# Patient Record
Sex: FEMALE | Race: WHITE | NOT HISPANIC OR LATINO
[De-identification: names, ages, dates, MRNs, and addresses within clinical notes are randomized per-mention and may not be internally consistent; named-entity substitution may affect disease eponyms.]

---

## 2017-11-20 ENCOUNTER — RECORD ABSTRACTING (OUTPATIENT)
Age: 67
End: 2017-11-20

## 2017-11-20 ENCOUNTER — APPOINTMENT (OUTPATIENT)
Dept: CARDIOLOGY | Facility: CLINIC | Age: 67
End: 2017-11-20
Payer: MEDICARE

## 2017-11-20 ENCOUNTER — NON-APPOINTMENT (OUTPATIENT)
Age: 67
End: 2017-11-20

## 2017-11-20 VITALS
BODY MASS INDEX: 23.39 KG/M2 | WEIGHT: 132 LBS | SYSTOLIC BLOOD PRESSURE: 138 MMHG | HEIGHT: 63 IN | DIASTOLIC BLOOD PRESSURE: 70 MMHG | HEART RATE: 86 BPM

## 2017-11-20 VITALS — DIASTOLIC BLOOD PRESSURE: 64 MMHG | SYSTOLIC BLOOD PRESSURE: 120 MMHG

## 2017-11-20 DIAGNOSIS — F41.9 ANXIETY DISORDER, UNSPECIFIED: ICD-10-CM

## 2017-11-20 DIAGNOSIS — Z87.2 PERSONAL HISTORY OF DISEASES OF THE SKIN AND SUBCUTANEOUS TISSUE: ICD-10-CM

## 2017-11-20 DIAGNOSIS — Z86.2 PERSONAL HISTORY OF DISEASES OF THE BLOOD AND BLOOD-FORMING ORGANS AND CERTAIN DISORDERS INVOLVING THE IMMUNE MECHANISM: ICD-10-CM

## 2017-11-20 DIAGNOSIS — Z82.49 FAMILY HISTORY OF ISCHEMIC HEART DISEASE AND OTHER DISEASES OF THE CIRCULATORY SYSTEM: ICD-10-CM

## 2017-11-20 DIAGNOSIS — E03.9 HYPOTHYROIDISM, UNSPECIFIED: ICD-10-CM

## 2017-11-20 DIAGNOSIS — Z80.51 FAMILY HISTORY OF MALIGNANT NEOPLASM OF KIDNEY: ICD-10-CM

## 2017-11-20 DIAGNOSIS — Z86.59 PERSONAL HISTORY OF OTHER MENTAL AND BEHAVIORAL DISORDERS: ICD-10-CM

## 2017-11-20 DIAGNOSIS — E66.3 OVERWEIGHT: ICD-10-CM

## 2017-11-20 DIAGNOSIS — Z78.9 OTHER SPECIFIED HEALTH STATUS: ICD-10-CM

## 2017-11-20 DIAGNOSIS — Z85.79 PERSONAL HISTORY OF OTHER MALIGNANT NEOPLASMS OF LYMPHOID, HEMATOPOIETIC AND RELATED TISSUES: ICD-10-CM

## 2017-11-20 DIAGNOSIS — Z86.39 PERSONAL HISTORY OF OTHER ENDOCRINE, NUTRITIONAL AND METABOLIC DISEASE: ICD-10-CM

## 2017-11-20 PROBLEM — Z00.00 ENCOUNTER FOR PREVENTIVE HEALTH EXAMINATION: Status: ACTIVE | Noted: 2017-11-20

## 2017-11-20 PROCEDURE — 93000 ELECTROCARDIOGRAM COMPLETE: CPT

## 2017-11-20 PROCEDURE — 99214 OFFICE O/P EST MOD 30 MIN: CPT

## 2017-11-20 RX ORDER — ATORVASTATIN CALCIUM 10 MG/1
10 TABLET, FILM COATED ORAL DAILY
Refills: 0 | Status: DISCONTINUED | COMMUNITY
End: 2017-11-20

## 2017-11-20 RX ORDER — FERROUS SULFATE 325(65) MG
325 (65 FE) TABLET ORAL DAILY
Refills: 0 | Status: DISCONTINUED | COMMUNITY
End: 2017-11-20

## 2017-11-28 ENCOUNTER — APPOINTMENT (OUTPATIENT)
Dept: CARDIOLOGY | Facility: CLINIC | Age: 67
End: 2017-11-28
Payer: MEDICARE

## 2017-11-28 PROCEDURE — 93306 TTE W/DOPPLER COMPLETE: CPT

## 2018-02-27 ENCOUNTER — APPOINTMENT (OUTPATIENT)
Dept: CARDIOLOGY | Facility: CLINIC | Age: 68
End: 2018-02-27
Payer: MEDICARE

## 2018-02-27 VITALS
HEIGHT: 63 IN | DIASTOLIC BLOOD PRESSURE: 68 MMHG | HEART RATE: 76 BPM | SYSTOLIC BLOOD PRESSURE: 110 MMHG | WEIGHT: 138 LBS | BODY MASS INDEX: 24.45 KG/M2

## 2018-02-27 PROCEDURE — 99214 OFFICE O/P EST MOD 30 MIN: CPT

## 2018-02-27 RX ORDER — CETIRIZINE HCL 10 MG
10 TABLET ORAL DAILY
Refills: 0 | Status: DISCONTINUED | COMMUNITY
End: 2018-02-27

## 2018-12-18 ENCOUNTER — APPOINTMENT (OUTPATIENT)
Dept: CARDIOLOGY | Facility: CLINIC | Age: 68
End: 2018-12-18
Payer: MEDICARE

## 2018-12-18 ENCOUNTER — NON-APPOINTMENT (OUTPATIENT)
Age: 68
End: 2018-12-18

## 2018-12-18 VITALS
BODY MASS INDEX: 22.68 KG/M2 | HEIGHT: 63 IN | OXYGEN SATURATION: 100 % | DIASTOLIC BLOOD PRESSURE: 60 MMHG | WEIGHT: 128 LBS | SYSTOLIC BLOOD PRESSURE: 123 MMHG | HEART RATE: 68 BPM

## 2018-12-18 DIAGNOSIS — R07.89 OTHER CHEST PAIN: ICD-10-CM

## 2018-12-18 PROCEDURE — 93306 TTE W/DOPPLER COMPLETE: CPT

## 2018-12-18 PROCEDURE — 99214 OFFICE O/P EST MOD 30 MIN: CPT

## 2018-12-18 PROCEDURE — 93000 ELECTROCARDIOGRAM COMPLETE: CPT

## 2018-12-18 RX ORDER — VITAMIN B COMPLEX
TABLET, EXTENDED RELEASE ORAL
Refills: 0 | Status: DISCONTINUED | COMMUNITY
End: 2018-12-18

## 2018-12-18 NOTE — PHYSICAL EXAM
[General Appearance - Well Developed] : well developed [Normal Appearance] : normal appearance [Well Groomed] : well groomed [General Appearance - Well Nourished] : well nourished [No Deformities] : no deformities [General Appearance - In No Acute Distress] : no acute distress [Normal Conjunctiva] : the conjunctiva exhibited no abnormalities [No Oral Pallor] : no oral pallor [Normal Jugular Venous A Waves Present] : normal jugular venous A waves present [Normal Jugular Venous V Waves Present] : normal jugular venous V waves present [No Jugular Venous Rosas A Waves] : no jugular venous rosas A waves [Respiration, Rhythm And Depth] : normal respiratory rhythm and effort [Exaggerated Use Of Accessory Muscles For Inspiration] : no accessory muscle use [Auscultation Breath Sounds / Voice Sounds] : lungs were clear to auscultation bilaterally [Heart Rate And Rhythm] : heart rate and rhythm were normal [Heart Sounds] : normal S1 and S2 [Arterial Pulses Normal] : the arterial pulses were normal [Edema] : no peripheral edema present [Veins - Varicosity Changes] : no varicosital changes were noted in the lower extremities [FreeTextEntry1] : hsm 2/6 at the base, No gallop, r, or bruit. It radiated systolic murmur [Abdomen Soft] : soft [Abnormal Walk] : normal gait [Gait - Sufficient For Exercise Testing] : the gait was sufficient for exercise testing [Nail Clubbing] : no clubbing of the fingernails [Cyanosis, Localized] : no localized cyanosis [Skin Color & Pigmentation] : normal skin color and pigmentation [] : no rash [No Xanthoma] : no  xanthoma was observed [Oriented To Time, Place, And Person] : oriented to person, place, and time [Affect] : the affect was normal [Mood] : the mood was normal [No Anxiety] : not feeling anxious

## 2018-12-18 NOTE — REVIEW OF SYSTEMS
[Feeling Fatigued] : feeling fatigued [Shortness Of Breath] : no shortness of breath [Dyspnea on exertion] : not dyspnea during exertion [Chest  Pressure] : no chest pressure [Chest Pain] : no chest pain [Lower Ext Edema] : no extremity edema [Leg Claudication] : no intermittent leg claudication [Palpitations] : palpitations [Anxiety] : anxiety [Negative] : Heme/Lymph

## 2018-12-18 NOTE — DISCUSSION/SUMMARY
[Patient] : the patient [Risks] : risks [Benefits] : benefits [Alternatives] : alternatives [___ Month(s)] : [unfilled] month(s) [FreeTextEntry1] : Assessment and recommendations.\par #1 echocardiogram and EKG reviewed. Stable. Mitral, aortic, and tricuspid insufficiency were noted. The moderate left atrial enlargement, with normal LV ejection fraction dimension. Borderline pulmonary artery systolic pressure. Clinically, no signs of left or right heart failure. Increasing activity and exercise. If worsening exertional symptoms. Consider noninvasive stress test like stress echocardiogram or nuclear myocardial perfusion scan. Otherwise, followup on echocardiogram is planned in 2-3 years.\par #2 history of essential hypertension. Her remaining very well controlled without any medications. Continue to follow.\par #3 hyperlipidemia. Mixed. Continue present medications. Labs to be forwarded to me for my review.\par #4 palpitations. No significant high risk arrhythmia noted in the past. She is preserved and ejection fraction. No significant ischemic heart disease. Continue to follow. If any significant worsening. She will contact me.\par \par All the above were at length reviewed. Answered all the questions. Thank you very much for this kind referral. Please do not hesitate to give me a call for any question.\par Part of this transcription was done with voice recognition software and phonetically similar errors are common. I apologize for that. Please donot hesitate to call for any questions due to above.\par \par Followup is otherwise planned in one year\par

## 2018-12-18 NOTE — HISTORY OF PRESENT ILLNESS
[FreeTextEntry1] : 67-year-old.\par Recently been treated for lymphoma.\par History of hypertension. On low dose of beta blockers in the past. Not on any medication at present. No history of congestive heart failure or renal insufficiency.\par History of mixed hyperlipidemia with elevated triglycerides. On fenofibrate and statin therapy. Tolerating it well.\par Hypothyroidism. . On Synthroid replacement be a stable TSH within last 3 months. We have been followed with endocrinologist.\par History of psoriasis.\par History of chronic anemia.\par History of palpitations in the past. Prior to evaluation, including echocardiogram and stress echocardiogram in 2016, which showed preserved systolic function and known ischemic stress echocardiogram. Evaluation with Holter and event recorder showing no significant high risk arrhythmias.\par Echocardiogram 2017 with preserved ejection fraction mild mitral, aortic, and tricuspid regurgitation, and borderline pulmonary artery systolic pressure.\par

## 2018-12-18 NOTE — ASSESSMENT
[FreeTextEntry1] : past tests for reference:\par Echocardiogram.  8/8/2016.  60%.  Trace to mild mitral regurgitation.  Normal chamber dimensions.\par Stress echocardiogram 8/16/2016.\par LV ejection fraction 60% at baseline.  6 minutes 24 seconds of Derrick protocol.  Greater than 100% predicted maximum heart rate.  Normal blood pressure response.  Nonischemic symptoms.  Nonischemic EKG changes.  Nonischemic echocardiogram portion of the test.\par EKG ordered and interpreted by me on November 21, 2017, PA indication. Palpitations.\par Interpretation. Normal sinus rhythm. Borderline abnormal EKG,\par Recent labs were reviewed on the phone done by oncologist, which showed triglycerides 217, AST of 48, , total cholesterol 276. CMP was stable.\par \par Reviewed February 27, 2018.\par Echocardiogram. November 28, 2017. Ejection fraction 60% normal chamber dimensions trace to mitral regurgitation, trace to mild aortic regurgitation PSP 37 mm mercury, suggestive borderline pulmonary pressures.\par Event recorder did not reveal any significant arrhythmias. She did not have significant palpitations.\par Labs from December 8, 2017 was reviewed\par \par Reviewed on December 18, 2018\par EKG December 18, 2018. Indication palpitation. History of essential hypertension with interpretation. Normal sinus rhythm.\par Echocardiogram was reviewed from December 18, 2018.\par Preserved ejection fraction. Mild mitral, aortic, and tricuspid regurgitation. Pulmonary artery systolic pressure around 33 mm mercury.\par I do not have recent labs though she does have recent labs, which he will be forwarding to me

## 2018-12-18 NOTE — REASON FOR VISIT
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 No significant aggravating or relieving factors. No associated dizziness, chest pain or shortness of breath. Mild severity.\par She does not do aggressive exercise and has stable dyspnea on exertion without any associated symptoms.\par She has no PND, orthopnea, or pedal edema.\par She has no history of syncope.\par She has no claudication pain.\par No recent cardiac hospital admission

## 2019-01-04 ENCOUNTER — APPOINTMENT (OUTPATIENT)
Dept: CARDIOLOGY | Facility: CLINIC | Age: 69
End: 2019-01-04
Payer: MEDICARE

## 2019-01-04 VITALS
WEIGHT: 130 LBS | HEART RATE: 70 BPM | SYSTOLIC BLOOD PRESSURE: 110 MMHG | DIASTOLIC BLOOD PRESSURE: 64 MMHG | BODY MASS INDEX: 23.04 KG/M2 | HEIGHT: 63 IN

## 2019-01-04 DIAGNOSIS — I65.01 OCCLUSION AND STENOSIS OF RIGHT VERTEBRAL ARTERY: ICD-10-CM

## 2019-01-04 DIAGNOSIS — R42 DIZZINESS AND GIDDINESS: ICD-10-CM

## 2019-01-04 PROCEDURE — 99214 OFFICE O/P EST MOD 30 MIN: CPT

## 2019-02-18 ENCOUNTER — RECORD ABSTRACTING (OUTPATIENT)
Age: 69
End: 2019-02-18

## 2019-02-21 ENCOUNTER — APPOINTMENT (OUTPATIENT)
Dept: CARDIOLOGY | Facility: CLINIC | Age: 69
End: 2019-02-21
Payer: MEDICARE

## 2019-02-21 VITALS
HEART RATE: 68 BPM | OXYGEN SATURATION: 98 % | WEIGHT: 120 LBS | DIASTOLIC BLOOD PRESSURE: 60 MMHG | BODY MASS INDEX: 21.26 KG/M2 | HEIGHT: 63 IN | SYSTOLIC BLOOD PRESSURE: 102 MMHG

## 2019-02-21 DIAGNOSIS — Z86.79 PERSONAL HISTORY OF OTHER DISEASES OF THE CIRCULATORY SYSTEM: ICD-10-CM

## 2019-02-21 PROCEDURE — 99214 OFFICE O/P EST MOD 30 MIN: CPT

## 2019-02-21 RX ORDER — LEVOTHYROXINE SODIUM 150 UG/1
150 TABLET ORAL DAILY
Refills: 0 | Status: DISCONTINUED | COMMUNITY
End: 2019-02-21

## 2019-02-21 RX ORDER — NALTREXONE HYDROCHLORIDE 50 MG/1
50 TABLET, FILM COATED ORAL DAILY
Refills: 0 | Status: DISCONTINUED | COMMUNITY
End: 2019-02-21

## 2019-02-21 RX ORDER — ATORVASTATIN CALCIUM 10 MG/1
10 TABLET, FILM COATED ORAL DAILY
Refills: 0 | Status: DISCONTINUED | COMMUNITY
End: 2019-02-21

## 2019-02-21 RX ORDER — CHOLECALCIFEROL (VITAMIN D3) 325 MCG
10000 CAPSULE ORAL
Refills: 0 | Status: DISCONTINUED | COMMUNITY
End: 2019-02-21

## 2019-02-21 NOTE — REVIEW OF SYSTEMS
[Feeling Fatigued] : feeling fatigued [see HPI] : see HPI [Shortness Of Breath] : no shortness of breath [Dyspnea on exertion] : not dyspnea during exertion [Chest  Pressure] : no chest pressure [Chest Pain] : no chest pain [Lower Ext Edema] : no extremity edema [Leg Claudication] : no intermittent leg claudication [Palpitations] : palpitations [Anxiety] : anxiety [Negative] : Heme/Lymph

## 2019-02-21 NOTE — HISTORY OF PRESENT ILLNESS
[FreeTextEntry1] : \par Recently been treated for lymphoma.\par History of hypertension. On low dose of beta blockers in the past. Not on any medication at present. No history of congestive heart failure or renal insufficiency.\par History of mixed hyperlipidemia with elevated triglycerides. On fenofibrate and statin therapy. Tolerating it well.\par Hypothyroidism. . On Synthroid replacement be a stable TSH within last 3 months. We have been followed with endocrinologist.\par History of psoriasis.\par History of chronic anemia.\par History of hyperlipidemia, on lifestyle modification\par History of palpitations in the past. Prior to evaluation, including echocardiogram and stress echocardiogram in 2016, which showed preserved systolic function and known ischemic stress echocardiogram. Evaluation with Holter and event recorder showing no significant high risk arrhythmias.\par Echocardiogram 2017 with preserved ejection fraction mild mitral, aortic, and tricuspid regurgitation, and borderline pulmonary artery systolic pressure.\par

## 2019-02-21 NOTE — DISCUSSION/SUMMARY
[Patient] : the patient [Risks] : risks [Benefits] : benefits [Alternatives] : alternatives [___ Month(s)] : [unfilled] month(s) [FreeTextEntry1] : Assessment and recommendations.\par #1 Event recorder findings reviewed. No significant relationship with symptoms. Brief episodes of atrial arrhythmia Does not make the diagnosis of atrial fibrillation. If intermittent palpitations, any decreased significant dizziness and near syncopal/syncopal episode consider implantable loop recorder. She does not want to pursue it at present. We have also discussed beta blockers, which are does not want to pursue.\par #2 history of essential hypertension. Her remaining very well controlled without any medications. Continue to follow.\par #3 hyperlipidemia. Mixed. Continue present medications. Labs to be forwarded to me for my review.  Does not want to  and will manage with pharmacotherapy.\par \par All the above were at length reviewed. Answered all the questions. Thank you very much for this kind referral. Please do not hesitate to give me a call for any question.\par Part of this transcription was done with voice recognition software and phonetically similar errors are common. I apologize for that. Please donot hesitate to call for any questions due to above.\par \par Followup is otherwise planned in one year\par

## 2019-02-21 NOTE — REASON FOR VISIT
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 No significant aggravating or relieving factors. No associated dizziness, chest pain or shortness of breath. Mild severity. She has decided against beta blocker\par She does not do aggressive exercise and has stable dyspnea on exertion without any associated symptoms.\par She has no PND, orthopnea, or pedal edema.\par She has no history of syncope.\par She has no claudication pain.\par No recent cardiac hospital admission

## 2019-02-21 NOTE — ASSESSMENT
[FreeTextEntry1] : past tests for reference:\par Echocardiogram.  8/8/2016.  60%.  Trace to mild mitral regurgitation.  Normal chamber dimensions.\par Stress echocardiogram 8/16/2016.\par LV ejection fraction 60% at baseline.  6 minutes 24 seconds of Derrick protocol.  Greater than 100% predicted maximum heart rate.  Normal blood pressure response.  Nonischemic symptoms.  Nonischemic EKG changes.  Nonischemic echocardiogram portion of the test.\par EKG ordered and interpreted by me on November 21, 2017, PA indication. Palpitations.\par Interpretation. Normal sinus rhythm. Borderline abnormal EKG,\par Recent labs were reviewed on the phone done by oncologist, which showed triglycerides 217, AST of 48, , total cholesterol 276. CMP was stable.\par \par February 21, 2019\par Event recorder. Normal sinus rhythm at baseline. Sinus tachycardia. Short less than 10 seconds of sequential PACs versus atrial tachycardia. No pauses noted.\par \par Reviewed February 27, 2018.\par Echocardiogram. November 28, 2017. Ejection fraction 60% normal chamber dimensions trace to mitral regurgitation, trace to mild aortic regurgitation PSP 37 mm mercury, suggestive borderline pulmonary pressures.\par Event recorder did not reveal any significant arrhythmias. She did not have significant palpitations.\par Labs from December 8, 2017 was reviewed\par \par Reviewed on December 18, 2018\par EKG December 18, 2018. Indication palpitation. History of essential hypertension with interpretation. Normal sinus rhythm.\par Echocardiogram was reviewed from December 18, 2018.\par Preserved ejection fraction. Mild mitral, aortic, and tricuspid regurgitation. Pulmonary artery systolic pressure around 33 mm mercury.\par I do not have recent labs though she does have recent labs, which he will be forwarding to me

## 2019-04-09 ENCOUNTER — APPOINTMENT (OUTPATIENT)
Dept: CARDIOLOGY | Facility: CLINIC | Age: 69
End: 2019-04-09
Payer: MEDICARE

## 2019-04-09 ENCOUNTER — NON-APPOINTMENT (OUTPATIENT)
Age: 69
End: 2019-04-09

## 2019-04-09 PROCEDURE — 99212 OFFICE O/P EST SF 10 MIN: CPT

## 2019-04-09 PROCEDURE — 93000 ELECTROCARDIOGRAM COMPLETE: CPT

## 2019-04-10 NOTE — PHYSICAL EXAM
[General Appearance - Well Developed] : well developed [Normal Appearance] : normal appearance [Well Groomed] : well groomed [General Appearance - Well Nourished] : well nourished [No Deformities] : no deformities [General Appearance - In No Acute Distress] : no acute distress [] : no respiratory distress [Respiration, Rhythm And Depth] : normal respiratory rhythm and effort [Auscultation Breath Sounds / Voice Sounds] : lungs were clear to auscultation bilaterally [Exaggerated Use Of Accessory Muscles For Inspiration] : no accessory muscle use [Heart Rate And Rhythm] : heart rate and rhythm were normal [Abnormal Walk] : normal gait [Heart Sounds] : normal S1 and S2 [Murmurs] : no murmurs present [Oriented To Time, Place, And Person] : oriented to person, place, and time [Skin Color & Pigmentation] : normal skin color and pigmentation [Affect] : the affect was normal [Mood] : the mood was normal

## 2019-04-11 NOTE — REVIEW OF SYSTEMS
[see HPI] : see HPI [Palpitations] : palpitations [Anxiety] : anxiety [Under Stress] : under stress [Negative] : Neurological [Dyspnea on exertion] : not dyspnea during exertion [Shortness Of Breath] : no shortness of breath [Chest Pain] : no chest pain [Chest  Pressure] : no chest pressure [Lower Ext Edema] : no extremity edema

## 2019-04-11 NOTE — HISTORY OF PRESENT ILLNESS
[FreeTextEntry1] : Ellie is a 68 y/o F with history of lymphoma, HLD, hypothyroidism, and anemia. \par No history of AF, MI, ischemic heart disease, CVA or TIA. \par \par She was last evaluated in our office Jan 2018 after a hospitalization for dizziness and syncopal episode. There was no evidence of acute neurologic event. No significant carotid stenosis. There was stenosis of right vertebral artery. Dx of mild concussion. In follow up, an event recorder was obtained. There was a very brief run of atach <3 seconds. This was reviewed with her by Dr Bustos and no further testing ordered or medication changes made. \par \par Since then, there has been no recurrent syncope. She comes in today because of increased awareness of her heart beat. Reports rate is controlled in the 80s but she continues to be concerned for the presence of afib. She denies chest pain, pressure, unusual shortness of breath, orthopnea, LE edema, lightheadedness, dizziness, near syncope.\par \par EKG today 4/9/19 ordered and interpreted by me reveals normal sinus rhythm. \par \par Most recent echocardiogram 12/2018 reveals normal LVEF and mild VHD.\par Stress echo in 2016 was nonischemic.

## 2019-04-11 NOTE — ASSESSMENT
[FreeTextEntry1] : Assessment and recommendations:\par \par 1) Awareness of heart beat: Patient denies any sensation of rapid rate at home. She checks her pulse and states usually 80s. Recent event recorder did not reveal any significant dysrhythmias. There was a very brief episode of atach < 3 seconds. Today her EKG reveals normal sinus rhythm. If there is continued concern for afib, I reviewed with her indications for event monitor. Otherwise she will continue to monitor her rate and rhythm at home and call us for any escalating symptoms. \par \par 2) Syncope: no recurrence; no evidence of ischemic heart disease or LV dysfunction by past testing in our office\par \par 3) Vertebrobasilar disease: tolerating antiplatelet therapy well. Continue followup with neurology. \par \par 4) HLD: tolerating statin therapy, continue current dose. \par \par Any questions and concerns were addressed and resolved. \par \par Sincerely,\par \par GAYATHRI Jacobo\par Patients history, testing, and plan reviewed with supervising MD: Dr. Effie Feng

## 2020-01-10 ENCOUNTER — APPOINTMENT (OUTPATIENT)
Dept: CARDIOLOGY | Facility: CLINIC | Age: 70
End: 2020-01-10
Payer: MEDICARE

## 2020-01-10 VITALS
OXYGEN SATURATION: 98 % | WEIGHT: 125 LBS | DIASTOLIC BLOOD PRESSURE: 64 MMHG | BODY MASS INDEX: 22.15 KG/M2 | HEART RATE: 87 BPM | SYSTOLIC BLOOD PRESSURE: 110 MMHG | HEIGHT: 63 IN

## 2020-01-10 PROCEDURE — 99214 OFFICE O/P EST MOD 30 MIN: CPT

## 2020-01-10 RX ORDER — ATORVASTATIN CALCIUM 10 MG/1
10 TABLET, FILM COATED ORAL DAILY
Refills: 0 | Status: ACTIVE | COMMUNITY

## 2020-01-10 NOTE — HISTORY OF PRESENT ILLNESS
[FreeTextEntry1] : \par Recently been treated for Sezary cell lymphoma.\par History of hypertension. On low dose of beta blockers in the past. Not on any medication at present. No history of congestive heart failure or renal insufficiency.\par History of mixed hyperlipidemia with elevated triglycerides. On fenofibrate and statin therapy. Tolerating it well.\par Hypothyroidism. . On Synthroid replacement be a stable TSH within last 3 months. We have been followed with endocrinologist.\par History of psoriasis.\par History of chronic anemia.\par History of hyperlipidemia, mixed on fenofibrate/atorvastatin\par Non-rheumatic mitral/aortic regurgitation.

## 2020-01-10 NOTE — PHYSICAL EXAM
[General Appearance - Well Developed] : well developed [Normal Appearance] : normal appearance [Well Groomed] : well groomed [General Appearance - Well Nourished] : well nourished [General Appearance - In No Acute Distress] : no acute distress [No Deformities] : no deformities [No Oral Pallor] : no oral pallor [Normal Conjunctiva] : the conjunctiva exhibited no abnormalities [Normal Jugular Venous A Waves Present] : normal jugular venous A waves present [No Jugular Venous Rosas A Waves] : no jugular venous rosas A waves [Normal Jugular Venous V Waves Present] : normal jugular venous V waves present [Auscultation Breath Sounds / Voice Sounds] : lungs were clear to auscultation bilaterally [Exaggerated Use Of Accessory Muscles For Inspiration] : no accessory muscle use [Respiration, Rhythm And Depth] : normal respiratory rhythm and effort [Heart Rate And Rhythm] : heart rate and rhythm were normal [Heart Sounds] : normal S1 and S2 [Arterial Pulses Normal] : the arterial pulses were normal [FreeTextEntry1] : hsm 2/6 at the base, musical quality, No gallop, no bruit. . Bilateral lower extremity pitting edema  [Veins - Varicosity Changes] : no varicosital changes were noted in the lower extremities [Abnormal Walk] : normal gait [Gait - Sufficient For Exercise Testing] : the gait was sufficient for exercise testing [Abdomen Soft] : soft [Nail Clubbing] : no clubbing of the fingernails [Cyanosis, Localized] : no localized cyanosis [] : no rash [No Xanthoma] : no  xanthoma was observed [Skin Color & Pigmentation] : normal skin color and pigmentation [Affect] : the affect was normal [Oriented To Time, Place, And Person] : oriented to person, place, and time [Mood] : the mood was normal [No Anxiety] : not feeling anxious

## 2020-01-10 NOTE — ASSESSMENT
[FreeTextEntry1] : past tests for reference:\par Echocardiogram.  8/8/2016.  60%.  Trace to mild mitral regurgitation.  Normal chamber dimensions.\par Stress echocardiogram 8/16/2016.\par LV ejection fraction 60% at baseline.  6 minutes 24 seconds of Derrick protocol.  Greater than 100% predicted maximum heart rate.  Normal blood pressure response.  Nonischemic symptoms.  Nonischemic EKG changes.  Nonischemic echocardiogram portion of the test.\par EKG ordered and interpreted by me on November 21, 2017, PA indication. Palpitations.\par Interpretation. Normal sinus rhythm. Borderline abnormal EKG,\par Recent labs were reviewed on the phone done by oncologist, which showed triglycerides 217, AST of 48, , total cholesterol 276. CMP was stable.\par \par February 21, 2019\par Event recorder. Normal sinus rhythm at baseline. Sinus tachycardia. Short less than 10 seconds of sequential PACs versus atrial tachycardia. No pauses noted.\par \par Reviewed February 27, 2018.\par Echocardiogram. November 28, 2017. Ejection fraction 60% normal chamber dimensions trace to mitral regurgitation, trace to mild aortic regurgitation PSP 37 mm mercury, suggestive borderline pulmonary pressures.\par Event recorder did not reveal any significant arrhythmias. She did not have significant palpitations.\par Labs from December 8, 2017 was reviewed\par \par Reviewed on December 18, 2018\par EKG December 18, 2018. Indication palpitation. History of essential hypertension with interpretation. Normal sinus rhythm.\par Echocardiogram was reviewed from December 18, 2018.\par Preserved ejection fraction. Mild mitral, aortic, and tricuspid regurgitation. Pulmonary artery systolic pressure around 33 mm mercury.\par I do not have recent labs though she does have recent labs, which he will be forwarding to me\par \par Reviewed on January 10, 2020. , triglycerides 133, HDL 26, total cholesterol 187. LFT, and BMP appears to be stable

## 2020-01-10 NOTE — DISCUSSION/SUMMARY
[Benefits] : benefits [Risks] : risks [Patient] : the patient [Alternatives] : alternatives [___ Month(s)] : [unfilled] month(s) [FreeTextEntry1] : Assessment and recommendations.\par #1Palpitations. Result. Continue to follow.\par #2 history of essential hypertension.  remaining very well controlled without any medications. Continue to follow.\par #3 hyperlipidemia. Mixed.Stable. Labs were reviewed. Continue present medications. Follow CMP regularly.\par #4 nonrheumatic mitral, aortic insufficiency. Aortic valve sclerosis. Preserved ejection fraction. Continue to follow.\par \par All the above were at length reviewed. Answered all the questions. Thank you very much for this kind referral. Please do not hesitate to give me a call for any question.\par Part of this transcription was done with voice recognition software and phonetically similar errors are common. I apologize for that. Please donot hesitate to call for any questions due to above.\par \par Followup is otherwise planned in one year\par

## 2020-01-10 NOTE — REVIEW OF SYSTEMS
[Feeling Fatigued] : feeling fatigued [Dyspnea on exertion] : not dyspnea during exertion [Shortness Of Breath] : no shortness of breath [Chest  Pressure] : no chest pressure [Lower Ext Edema] : lower extremity edema [Chest Pain] : no chest pain [Leg Claudication] : no intermittent leg claudication [Skin: A Rash] : rash: [see HPI] : see HPI [Palpitations] : no palpitations [Itching] : itching [Change In Color Of Skin] : change in skin color [Skin Lesions] : skin lesion(s): [Anxiety] : anxiety [Negative] : Heme/Lymph

## 2020-01-10 NOTE — REASON FOR VISIT
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 She is a retired physician. f/u labs.\par Since last seen she offers no new complaint of palpitations.\par Her blood pressure is stable without any medications.\par She has been tolerating her lipid-lowering agents without any significant adverse effect.\par She is unable to do regular exercise because of her medical conditions related to hematological problems/Sezary cell lymphoma\par She has no significant PND, orthopnea.\par She does have bilateral pedal edema, related to her hematological problems in the form of lymphedema. Her morning and evening. Improves with compression and elevation.\par There is no significant dizziness, near syncopal or syncopal event.\par She has no chest pain\par No recent cardiac hospital admission

## 2020-11-03 ENCOUNTER — APPOINTMENT (OUTPATIENT)
Dept: CARDIOLOGY | Facility: CLINIC | Age: 70
End: 2020-11-03
Payer: MEDICARE

## 2020-11-03 PROCEDURE — 93306 TTE W/DOPPLER COMPLETE: CPT

## 2020-11-03 PROCEDURE — 93356 MYOCRD STRAIN IMG SPCKL TRCK: CPT

## 2020-11-20 ENCOUNTER — APPOINTMENT (OUTPATIENT)
Dept: CARDIOLOGY | Facility: CLINIC | Age: 70
End: 2020-11-20
Payer: MEDICARE

## 2020-11-20 VITALS — HEIGHT: 63 IN | WEIGHT: 111 LBS | BODY MASS INDEX: 19.67 KG/M2

## 2020-11-20 DIAGNOSIS — I08.0 RHEUMATIC DISORDERS OF BOTH MITRAL AND AORTIC VALVES: ICD-10-CM

## 2020-11-20 PROCEDURE — 99214 OFFICE O/P EST MOD 30 MIN: CPT | Mod: 95

## 2020-11-20 RX ORDER — BEXAROTENE 75 MG/1
75 CAPSULE, LIQUID FILLED ORAL DAILY
Refills: 0 | Status: DISCONTINUED | COMMUNITY
End: 2020-11-20

## 2020-11-20 NOTE — PHYSICAL EXAM
[General Appearance - Well Developed] : well developed [Normal Appearance] : normal appearance [Well Groomed] : well groomed [General Appearance - Well Nourished] : well nourished [No Deformities] : no deformities [General Appearance - In No Acute Distress] : no acute distress [] : no respiratory distress [Respiration, Rhythm And Depth] : normal respiratory rhythm and effort [Exaggerated Use Of Accessory Muscles For Inspiration] : no accessory muscle use [Oriented To Time, Place, And Person] : oriented to person, place, and time [Affect] : the affect was normal [Mood] : the mood was normal [No Anxiety] : not feeling anxious

## 2020-11-20 NOTE — ASSESSMENT
[FreeTextEntry1] : past tests for reference:\par Echocardiogram.  8/8/2016.  60%.  Trace to mild mitral regurgitation.  Normal chamber dimensions.\par Stress echocardiogram 8/16/2016.\par LV ejection fraction 60% at baseline.  6 minutes 24 seconds of Derrick protocol.  Greater than 100% predicted maximum heart rate.  Normal blood pressure response.  Nonischemic symptoms.  Nonischemic EKG changes.  Nonischemic echocardiogram portion of the test.\par EKG ordered and interpreted by me on November 21, 2017, PA indication. Palpitations.\par Interpretation. Normal sinus rhythm. Borderline abnormal EKG,\par Recent labs were reviewed on the phone done by oncologist, which showed triglycerides 217, AST of 48, , total cholesterol 276. CMP was stable.\par \par February 21, 2019\par Event recorder. Normal sinus rhythm at baseline. Sinus tachycardia. Short less than 10 seconds of sequential PACs versus atrial tachycardia. No pauses noted.\par \par Reviewed February 27, 2018.\par Echocardiogram. November 28, 2017. Ejection fraction 60% normal chamber dimensions trace to mitral regurgitation, trace to mild aortic regurgitation PSP 37 mm mercury, suggestive borderline pulmonary pressures.\par Event recorder did not reveal any significant arrhythmias. She did not have significant palpitations.\par Labs from December 8, 2017 was reviewed\par \par Reviewed on December 18, 2018\par EKG December 18, 2018. Indication palpitation. History of essential hypertension with interpretation. Normal sinus rhythm.\par Echocardiogram was reviewed from December 18, 2018.\par Preserved ejection fraction. Mild mitral, aortic, and tricuspid regurgitation. Pulmonary artery systolic pressure around 33 mm mercury.\par I do not have recent labs though she does have recent labs, which he will be forwarding to me\par \par Reviewed on January 10, 2020. , triglycerides 133, HDL 26, total cholesterol 187. LFT, and BMP appears to be stable\par \par Reviewed on November 28, 2020\par Echocardiogram.  November 3, 2020 EF 67% mild mitral and minimal aortic regurgitation PASP 44 mmHg global longitudinal strain -25 which is normal

## 2020-11-20 NOTE — DISCUSSION/SUMMARY
[Patient] : the patient [Risks] : risks [Benefits] : benefits [Alternatives] : alternatives [___ Month(s)] : [unfilled] month(s) [FreeTextEntry1] : Assessment and recommendations.\par #1Palpitations.  Mild early chronotropic exaggerated response with exercise though remained stable with continuation of the activity and exercise.  History of atrial arrhythmias in the past.  Recommended to continue activity and exercise if any worsening symptoms she will contact us in that case we will have another cardiac monitoring evaluation.\par #2 history of essential hypertension.  remaining relatively well controlled without any medications.  Has noticed slightly higher systolic and diastolic numbers at around 130/80.  She will continue to follow.  No antidepressant therapy at present.  \par #3 hyperlipidemia. Mixed.no recent labs available.  On Lipitor and fenofibrate.  Recent chemotherapy agent which can increase triglycerides have been discontinued.  Recommended to obtain another fasting lipid panel to guide combination therapy. \par #4 nonrheumatic mitral, aortic insufficiency. Aortic valve sclerosis. Preserved ejection fraction.  Mild borderline elevation pulmonary pressures.  Follow-up echocardiogram in 1 year.\par \par All the above were at length reviewed. Answered all the questions. Thank you very much for this kind referral. Please do not hesitate to give me a call for any question.\par Part of this transcription was done with voice recognition software and phonetically similar errors are common. I apologize for that. Please donot hesitate to call for any questions due to above.\par \par Followup is otherwise planned in one year\par

## 2020-11-20 NOTE — REASON FOR VISIT
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  She is being able to continue with exercise without any associated symptoms of chest pain, dizziness lightheadedness.\par She denies any PND orthopnea\par She has no pedal edema\par Anxiety associated with her medical problems\par No recent hospital admission from cardiac point of view\par

## 2020-11-20 NOTE — REVIEW OF SYSTEMS
[Feeling Fatigued] : feeling fatigued [Lower Ext Edema] : lower extremity edema [see HPI] : see HPI [Skin: A Rash] : rash: [Itching] : itching [Change In Color Of Skin] : change in skin color [Skin Lesions] : skin lesion(s): [Anxiety] : anxiety [Negative] : Heme/Lymph [Shortness Of Breath] : no shortness of breath [Dyspnea on exertion] : not dyspnea during exertion [Chest  Pressure] : no chest pressure [Chest Pain] : no chest pain [Leg Claudication] : no intermittent leg claudication [Palpitations] : no palpitations

## 2020-12-04 ENCOUNTER — TRANSCRIPTION ENCOUNTER (OUTPATIENT)
Age: 70
End: 2020-12-04

## 2021-02-25 ENCOUNTER — NON-APPOINTMENT (OUTPATIENT)
Age: 71
End: 2021-02-25

## 2021-08-13 ENCOUNTER — NON-APPOINTMENT (OUTPATIENT)
Age: 71
End: 2021-08-13

## 2021-08-25 ENCOUNTER — APPOINTMENT (OUTPATIENT)
Dept: CARDIOLOGY | Facility: CLINIC | Age: 71
End: 2021-08-25
Payer: MEDICARE

## 2021-08-25 ENCOUNTER — NON-APPOINTMENT (OUTPATIENT)
Age: 71
End: 2021-08-25

## 2021-08-25 VITALS
BODY MASS INDEX: 20.73 KG/M2 | OXYGEN SATURATION: 97 % | SYSTOLIC BLOOD PRESSURE: 118 MMHG | HEART RATE: 83 BPM | DIASTOLIC BLOOD PRESSURE: 64 MMHG | HEIGHT: 63 IN | WEIGHT: 117 LBS

## 2021-08-25 DIAGNOSIS — R60.0 LOCALIZED EDEMA: ICD-10-CM

## 2021-08-25 PROCEDURE — 99214 OFFICE O/P EST MOD 30 MIN: CPT

## 2021-08-25 PROCEDURE — 93000 ELECTROCARDIOGRAM COMPLETE: CPT

## 2021-08-25 RX ORDER — ALPRAZOLAM 0.25 MG/1
0.25 TABLET ORAL
Refills: 0 | Status: ACTIVE | COMMUNITY

## 2021-08-25 RX ORDER — GABAPENTIN 300 MG/1
300 CAPSULE ORAL 3 TIMES DAILY
Qty: 270 | Refills: 1 | Status: DISCONTINUED | COMMUNITY
Start: 2020-11-20 | End: 2021-08-25

## 2021-08-25 RX ORDER — FENOFIBRATE 160 MG/1
160 TABLET ORAL DAILY
Qty: 90 | Refills: 3 | Status: DISCONTINUED | COMMUNITY
End: 2021-08-25

## 2021-08-25 RX ORDER — LEVOTHYROXINE SODIUM 0.15 MG/1
150 TABLET ORAL
Refills: 0 | Status: DISCONTINUED | COMMUNITY
End: 2021-08-25

## 2021-08-25 NOTE — DISCUSSION/SUMMARY
[Patient] : the patient [Risks] : risks [Benefits] : benefits [Alternatives] : alternatives [___ Month(s)] : in [unfilled] month(s) [FreeTextEntry1] : Assessment and recommendations.\par #1  New findings of right bundle branch block.  Asymptomatic.  Good exercise tolerance.  Less likely to be coronary ischemia.  Would do echocardiogram for any significant wall motion abnormality.  As long as LV ejection fraction wall motion is stable would not pursue any significant ischemic evaluation in absence of symptoms.\par Continue with statin therapy at present\par If any progressive conduction system abnormality which can lead to dizziness lightheadedness symptomatic bradycardia arrhythmias then she may require permanent pacemaker.\par #2 history of essential hypertension.  remaining relatively well controlled without any medications.  Has noticed slightly higher systolic and diastolic numbers at around 130/80.  She will continue to follow.  No antidepressant therapy at present.  \par #3 hyperlipidemia. Mixed.no recent labs available.  On Lipitor .  Last lipid panel in January were very stable.  Reviewed.\par #4 nonrheumatic mitral, aortic insufficiency. Aortic valve sclerosis. Preserved ejection fraction.  Mild borderline elevation pulmonary pressures.  \par #5 lower extremity ankle edema.  Varicose veins.  Most likely related to venous insufficiency.  We will check BNP.  Echocardiogram.  Low probability of heart failure or right heart failure as a cause for her pedal edema.\par 6.  Lymphoma.  Chemotherapy intermittently.  Echocardiogram will be also done to evaluate strain rate.  If any evidence of cardiomyopathy more aggressive approach and management for prevention of LV systolic dysfunction.\par \par All the above were at length reviewed. Answered all the questions. Thank you very much for this kind referral. Please do not hesitate to give me a call for any question.\par Part of this transcription was done with voice recognition software and phonetically similar errors are common. I apologize for that. Please donot hesitate to call for any questions due to above.\par \par Followup is otherwise planned in one year\par

## 2021-08-25 NOTE — REASON FOR VISIT
[Symptom and Test Evaluation] : symptom and test evaluation [Hyperlipidemia] : hyperlipidemia [Hypertension] : hypertension [FreeTextEntry3] : Dr. 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  Mild ankle edema at the end of the day was the reason her oncology unit ask her to come and see me.  She also had a left scapular pain x1.  Nonexertional.  Resolves on its own.  Since then she has been walking on the beach and does elliptical half an hour without any symptoms.\par She has occasional palpitation.  No significant worsening from baseline.\par She denies any PND orthopnea\par She has no pedal edema\par Anxiety associated with her medical problems\par No recent hospital admission from cardiac point of view\par

## 2021-08-25 NOTE — ASSESSMENT
[FreeTextEntry1] : past tests for reference:\par Echocardiogram.  8/8/2016.  60%.  Trace to mild mitral regurgitation.  Normal chamber dimensions.\par Stress echocardiogram 8/16/2016.\par LV ejection fraction 60% at baseline.  6 minutes 24 seconds of Derrick protocol.  Greater than 100% predicted maximum heart rate.  Normal blood pressure response.  Nonischemic symptoms.  Nonischemic EKG changes.  Nonischemic echocardiogram portion of the test.\par EKG ordered and interpreted by me on November 21, 2017, PA indication. Palpitations.\par Interpretation. Normal sinus rhythm. Borderline abnormal EKG,\par Recent labs were reviewed on the phone done by oncologist, which showed triglycerides 217, AST of 48, , total cholesterol 276. CMP was stable.\par \par February 21, 2019\par Event recorder. Normal sinus rhythm at baseline. Sinus tachycardia. Short less than 10 seconds of sequential PACs versus atrial tachycardia. No pauses noted.\par \par Reviewed February 27, 2018.\par Echocardiogram. November 28, 2017. Ejection fraction 60% normal chamber dimensions trace to mitral regurgitation, trace to mild aortic regurgitation PSP 37 mm mercury, suggestive borderline pulmonary pressures.\par Event recorder did not reveal any significant arrhythmias. She did not have significant palpitations.\par Labs from December 8, 2017 was reviewed\par \par Reviewed on December 18, 2018\par EKG December 18, 2018. Indication palpitation. History of essential hypertension with interpretation. Normal sinus rhythm.\par Echocardiogram was reviewed from December 18, 2018.\par Preserved ejection fraction. Mild mitral, aortic, and tricuspid regurgitation. Pulmonary artery systolic pressure around 33 mm mercury.\par I do not have recent labs though she does have recent labs, which he will be forwarding to me\par \par Reviewed on January 10, 2020. , triglycerides 133, HDL 26, total cholesterol 187. LFT, and BMP appears to be stable\par \par Reviewed on November 28, 2020\par Echocardiogram.  November 3, 2020 EF 67% mild mitral and minimal aortic regurgitation PASP 44 mmHg global longitudinal strain -25 which is normal\par \par Reviewed on August 25, 2021.\par Labs from June 23, 2021.  Stable CBC.  CMP with albumin 4.6 sodium 140 potassium 3.9 creatinine 0.040.\par LFT normal.  TSH normal\par EKG normal sinus rhythm with right bundle branch block

## 2021-08-25 NOTE — HISTORY OF PRESENT ILLNESS
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 On low dose of beta blockers in the past. Not on any medication at present. No history of congestive heart failure or renal insufficiency.\par History of mixed hyperlipidemia with elevated triglycerides. On fenofibrate and statin therapy. Tolerating it well.\par Hypothyroidism. . On Synthroid replacement be a stable TSH within last 3 months. We have been followed with endocrinologist.\par History of psoriasis.\par History of chronic anemia.\par History of hyperlipidemia, mixed on fenofibrate/atorvastatin\par Non-rheumatic mitral/aortic regurgitation.

## 2021-08-25 NOTE — CARDIOLOGY SUMMARY
[___] : [unfilled] [LVEF ___%] : LVEF [unfilled]% [None] : no pulmonary hypertension [Enlarged] : enlarged LA size [Mild] : mild mitral regurgitation [de-identified] : August 25, 2025.  Normal sinus rhythm with right bundle branch block

## 2021-08-25 NOTE — PHYSICAL EXAM
[Well Developed] : well developed [Well Nourished] : well nourished [No Acute Distress] : no acute distress [Normal Conjunctiva] : normal conjunctiva [Normal Venous Pressure] : normal venous pressure [No Carotid Bruit] : no carotid bruit [Normal S1, S2] : normal S1, S2 [No Rub] : no rub [No Gallop] : no gallop [Clear Lung Fields] : clear lung fields [Good Air Entry] : good air entry [No Respiratory Distress] : no respiratory distress  [Soft] : abdomen soft [Non Tender] : non-tender [No Masses/organomegaly] : no masses/organomegaly [Normal Bowel Sounds] : normal bowel sounds [Normal Gait] : normal gait [No Edema] : no edema [No Cyanosis] : no cyanosis [No Clubbing] : no clubbing [Moves all extremities] : moves all extremities [No Focal Deficits] : no focal deficits [Normal Speech] : normal speech [Alert and Oriented] : alert and oriented [Normal memory] : normal memory [Normal Radial B/L] : normal radial B/L [Normal PT B/L] : normal PT B/L [Normal DP B/L] : normal DP B/L [Venous stasis] : venous stasis [Venous varicosities] : venous varicosities [de-identified] : Systolic murmur 2/6 left parasternal region at the base. [de-identified] : Skin changes related to see Sezary cell lymphoma.

## 2021-08-25 NOTE — REVIEW OF SYSTEMS
[Feeling Fatigued] : feeling fatigued [SOB] : no shortness of breath [Dyspnea on exertion] : not dyspnea during exertion [Chest Discomfort] : chest discomfort [Lower Ext Edema] : lower extremity edema [Leg Claudication] : no intermittent leg claudication [Palpitations] : palpitations [Orthopnea] : no orthopnea [PND] : no PND [Syncope] : no syncope [Joint Pain] : no joint pain [Anxiety] : anxiety [Under Stress] : under stress [Negative] : Musculoskeletal

## 2021-08-27 ENCOUNTER — APPOINTMENT (OUTPATIENT)
Dept: CARDIOLOGY | Facility: CLINIC | Age: 71
End: 2021-08-27
Payer: MEDICARE

## 2021-08-27 PROCEDURE — 93306 TTE W/DOPPLER COMPLETE: CPT

## 2021-09-10 ENCOUNTER — NON-APPOINTMENT (OUTPATIENT)
Age: 71
End: 2021-09-10

## 2022-06-14 ENCOUNTER — APPOINTMENT (OUTPATIENT)
Dept: CARDIOLOGY | Facility: CLINIC | Age: 72
End: 2022-06-14
Payer: MEDICARE

## 2022-06-14 ENCOUNTER — NON-APPOINTMENT (OUTPATIENT)
Age: 72
End: 2022-06-14

## 2022-06-14 VITALS
WEIGHT: 118 LBS | BODY MASS INDEX: 20.91 KG/M2 | SYSTOLIC BLOOD PRESSURE: 134 MMHG | OXYGEN SATURATION: 100 % | HEIGHT: 63 IN | DIASTOLIC BLOOD PRESSURE: 76 MMHG | HEART RATE: 109 BPM

## 2022-06-14 DIAGNOSIS — R09.89 OTHER SPECIFIED SYMPTOMS AND SIGNS INVOLVING THE CIRCULATORY AND RESPIRATORY SYSTEMS: ICD-10-CM

## 2022-06-14 PROCEDURE — 99214 OFFICE O/P EST MOD 30 MIN: CPT

## 2022-06-14 PROCEDURE — 93000 ELECTROCARDIOGRAM COMPLETE: CPT

## 2022-06-14 RX ORDER — SENNOSIDES 8.6 MG TABLETS 8.6 MG/1
8.6 TABLET ORAL
Refills: 0 | Status: DISCONTINUED | COMMUNITY
End: 2022-06-14

## 2022-06-14 RX ORDER — FEXOFENADINE HCL 60 MG
CAPSULE ORAL
Refills: 0 | Status: DISCONTINUED | COMMUNITY
End: 2022-06-14

## 2022-06-14 RX ORDER — DOCUSATE SODIUM 100 MG/1
100 CAPSULE ORAL
Refills: 0 | Status: DISCONTINUED | COMMUNITY
End: 2022-06-14

## 2022-06-14 RX ORDER — LEVOTHYROXINE SODIUM 0.09 MG/1
88 TABLET ORAL DAILY
Refills: 0 | Status: DISCONTINUED | COMMUNITY
End: 2022-06-14

## 2022-06-14 NOTE — REASON FOR VISIT
[Symptom and Test Evaluation] : symptom and test evaluation [Hyperlipidemia] : hyperlipidemia [Hypertension] : hypertension [Spouse] : spouse [FreeTextEntry3] : Dr. 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  She tried Valsalva and then drank couple of cold glass of water with improved heart rate.  Symptoms returned when she was coming to the office for urgent evaluation.\par She has no significant dizziness lightheadedness.  No near syncopal or syncopal event.\par Continued fatigue and tiredness\par Anxious about chemotherapy to be started on Thursday\par No complaint of chest pain.\par She denies any PND orthopnea\par She has no pedal edema\par Anxiety associated with her medical problems\par No recent hospital admission from cardiac point of view\par

## 2022-06-14 NOTE — PHYSICAL EXAM
[Well Nourished] : well nourished [No Acute Distress] : no acute distress [Normal Venous Pressure] : normal venous pressure [No Carotid Bruit] : no carotid bruit [No Rub] : no rub [No Gallop] : no gallop [Clear Lung Fields] : clear lung fields [Good Air Entry] : good air entry [No Respiratory Distress] : no respiratory distress  [Normal Gait] : normal gait [No Edema] : no edema [No Cyanosis] : no cyanosis [No Clubbing] : no clubbing [Normal Radial B/L] : normal radial B/L [Venous stasis] : venous stasis [Venous varicosities] : venous varicosities [Moves all extremities] : moves all extremities [Normal Speech] : normal speech [Alert and Oriented] : alert and oriented [Frail] : frail [de-identified] : Tachycardia, systolic murmur 2/6 left parasternal region at the base. [de-identified] : Skin changes related to see Sezary cell lymphoma.

## 2022-06-14 NOTE — ASSESSMENT
[FreeTextEntry1] : past tests for reference:\par Echocardiogram.  8/8/2016.  60%.  Trace to mild mitral regurgitation.  Normal chamber dimensions.\par Stress echocardiogram 8/16/2016.\par LV ejection fraction 60% at baseline.  6 minutes 24 seconds of Derrick protocol.  Greater than 100% predicted maximum heart rate.  Normal blood pressure response.  Nonischemic symptoms.  Nonischemic EKG changes.  Nonischemic echocardiogram portion of the test.\par EKG ordered and interpreted by me on November 21, 2017, PA indication. Palpitations.\par Interpretation. Normal sinus rhythm. Borderline abnormal EKG,\par Recent labs were reviewed on the phone done by oncologist, which showed triglycerides 217, AST of 48, , total cholesterol 276. CMP was stable.\par \par February 21, 2019\par Event recorder. Normal sinus rhythm at baseline. Sinus tachycardia. Short less than 10 seconds of sequential PACs versus atrial tachycardia. No pauses noted.\par \par Reviewed February 27, 2018.\par Echocardiogram. November 28, 2017. Ejection fraction 60% normal chamber dimensions trace to mitral regurgitation, trace to mild aortic regurgitation PSP 37 mm mercury, suggestive borderline pulmonary pressures.\par Event recorder did not reveal any significant arrhythmias. She did not have significant palpitations.\par Labs from December 8, 2017 was reviewed\par \par Reviewed on December 18, 2018\par EKG December 18, 2018. Indication palpitation. History of essential hypertension with interpretation. Normal sinus rhythm.\par Echocardiogram was reviewed from December 18, 2018.\par Preserved ejection fraction. Mild mitral, aortic, and tricuspid regurgitation. Pulmonary artery systolic pressure around 33 mm mercury.\par I do not have recent labs though she does have recent labs, which he will be forwarding to me\par \par Reviewed on January 10, 2020. , triglycerides 133, HDL 26, total cholesterol 187. LFT, and BMP appears to be stable\par \par Reviewed on November 28, 2020\par Echocardiogram.  November 3, 2020 EF 67% mild mitral and minimal aortic regurgitation PASP 44 mmHg global longitudinal strain -25 which is normal\par \par Reviewed on August 25, 2021.\par Labs from June 23, 2021.  Stable CBC.  CMP with albumin 4.6 sodium 140 potassium 3.9 creatinine 0.040.\par LFT normal.  TSH normal\par EKG normal sinus rhythm with right bundle branch block\par \par Reviewed on June 14, 2022.\par Labs from May 17, 2022 as shown stable CBC sodium 140 potassium 3.9 creatinine 0.40.\par EKG as noted above\par Echocardiogram reviewed as noted above

## 2022-06-14 NOTE — DISCUSSION/SUMMARY
[Patient] : the patient [Risks] : risks [Benefits] : benefits [Alternatives] : alternatives [___ Month(s)] : in [unfilled] month(s) [FreeTextEntry1] : Assessment and recommendations.\par #1  Tachycardia.  Palpitations.  Reviewed role of event monitoring, beta-blockers etc. with her and her .  At present she would like to follow and if there are recurrent more episodes she would consider event monitoring and use of beta-blockers.  Understands high risk symptoms to notify us immediately.\par #2 history of essential hypertension.  remaining relatively well controlled without any medications.  Has noticed slightly higher systolic and diastolic numbers at around 130/80.  She will continue to follow.  No antidepressant therapy at present.  \par #3 hyperlipidemia. Mixed..no recent labs available.  On Lipitor .  Last lipid panel in January were very stable.  Reviewed.\par #4 nonrheumatic mitral, aortic insufficiency. Aortic valve sclerosis. Preserved ejection fraction.  Mild borderline elevation pulmonary pressures.  Most recent echocardiogram stable with good global longitudinal strain.\par #5 lower extremity ankle edema.  Varicose veins.  Most likely related to venous insufficiency.  We will check BNP.  Echocardiogram.  Low probability of heart failure or right heart failure as a cause for her pedal edema.\par 6.  Lymphoma.  Chemotherapy intermittently.  At risk for cardiomyopathy.  Normal global longitudinal strain in April.\par \par All the above were at length reviewed. Answered all the questions. Thank you very much for this kind referral. Please do not hesitate to give me a call for any question.\par Part of this transcription was done with voice recognition software and phonetically similar errors are common. I apologize for that. Please donot hesitate to call for any questions due to above.\par \par Follow-up as needed.\par \par Sincerely,\par Greg Bustos MD,FACC,[facility]\par \par

## 2022-06-14 NOTE — REVIEW OF SYSTEMS
[Feeling Fatigued] : feeling fatigued [Lower Ext Edema] : lower extremity edema [Palpitations] : palpitations [Anxiety] : anxiety [Under Stress] : under stress [Negative] : Heme/Lymph [SOB] : no shortness of breath [Dyspnea on exertion] : not dyspnea during exertion [Chest Discomfort] : no chest discomfort [Leg Claudication] : no intermittent leg claudication [Orthopnea] : no orthopnea [PND] : no PND [Syncope] : no syncope [Joint Pain] : no joint pain

## 2022-06-14 NOTE — CARDIOLOGY SUMMARY
unfilled] [LVEF ___%] : LVEF [unfilled]% [None] : no pulmonary hypertension [Enlarged] : enlarged LA size [Mild] : mild mitral regurgitation [de-identified] : August 27, 2021 EF 55% mild mitral regurgitation normal left atrium RVSP 25.\par Echocardiogram.  Outside office.  April 12, 2022.  LVEF 60%.  Mild mitral and aortic regurgitation mild tricuspid regurgitation Global longitudinal strain -21.5%. [de-identified] : August 25, 2022.  Normal sinus rhythm with right bundle branch block\par June 14, 2022.  Sinus tachycardia right bundle branch block

## 2022-07-22 ENCOUNTER — NON-APPOINTMENT (OUTPATIENT)
Age: 72
End: 2022-07-22

## 2022-11-30 ENCOUNTER — APPOINTMENT (OUTPATIENT)
Dept: CARDIOLOGY | Facility: CLINIC | Age: 72
End: 2022-11-30

## 2023-01-04 ENCOUNTER — NON-APPOINTMENT (OUTPATIENT)
Age: 73
End: 2023-01-04

## 2023-01-10 ENCOUNTER — OFFICE (OUTPATIENT)
Dept: URBAN - METROPOLITAN AREA CLINIC 8 | Facility: CLINIC | Age: 73
Setting detail: OPHTHALMOLOGY
End: 2023-01-10
Payer: MEDICARE

## 2023-01-10 DIAGNOSIS — H16.223: ICD-10-CM

## 2023-01-10 DIAGNOSIS — H25.13: ICD-10-CM

## 2023-01-10 DIAGNOSIS — H43.23: ICD-10-CM

## 2023-01-10 DIAGNOSIS — G43.109: ICD-10-CM

## 2023-01-10 PROCEDURE — 92014 COMPRE OPH EXAM EST PT 1/>: CPT | Performed by: OPHTHALMOLOGY

## 2023-01-10 ASSESSMENT — REFRACTION_MANIFEST
OS_VA2: 20/20
OS_ADD: +2.50
OD_SPHERE: +1.00
OD_VA2: 20/20
OS_SPHERE: +1.75
OD_CYLINDER: -0.50
OS_CYLINDER: -0.75
OS_AXIS: 085
OS_VA1: 20/20
OD_AXIS: 095
OU_VA: 20/20-1
OD_VA1: 20/30-1
OD_ADD: +2.50

## 2023-01-10 ASSESSMENT — REFRACTION_AUTOREFRACTION
OS_CYLINDER: -1.75
OS_AXIS: 083
OD_AXIS: 095
OS_SPHERE: +2.50
OD_CYLINDER: -1.50
OD_SPHERE: +2.75

## 2023-01-10 ASSESSMENT — VISUAL ACUITY
OS_BCVA: 20/30-2
OD_BCVA: 20/30

## 2023-01-10 ASSESSMENT — KERATOMETRY
OS_K1POWER_DIOPTERS: 45.25
OS_K2POWER_DIOPTERS: 46.00
OD_K2POWER_DIOPTERS: 46.75
OD_AXISANGLE_DEGREES: 050
OD_K1POWER_DIOPTERS: 45.75
OS_AXISANGLE_DEGREES: 148

## 2023-01-10 ASSESSMENT — AXIALLENGTH_DERIVED
OD_AL: 21.9287
OD_AL: 22.3577
OS_AL: 22.2582
OS_AL: 22.3452

## 2023-01-10 ASSESSMENT — REFRACTION_CURRENTRX
OS_SPHERE: +1.50
OD_CYLINDER: SPHERE
OD_OVR_VA: 20/
OS_OVR_VA: 20/
OS_CYLINDER: -0.50
OD_SPHERE: +2.50
OD_VPRISM_DIRECTION: SV
OD_CYLINDER: SPH
OD_SPHERE: +1.50
OD_OVR_VA: 20/
OS_SPHERE: +2.50
OS_OVR_VA: 20/
OS_AXIS: 088
OS_VPRISM_DIRECTION: SV
OS_CYLINDER: SPHERE

## 2023-01-10 ASSESSMENT — SUPERFICIAL PUNCTATE KERATITIS (SPK)
OD_SPK: T
OS_SPK: T

## 2023-01-10 ASSESSMENT — SPHEQUIV_DERIVED
OS_SPHEQUIV: 1.375
OD_SPHEQUIV: 2
OS_SPHEQUIV: 1.625
OD_SPHEQUIV: 0.75

## 2023-01-10 ASSESSMENT — CONFRONTATIONAL VISUAL FIELD TEST (CVF)
OS_FINDINGS: FULL
OD_FINDINGS: FULL

## 2023-01-11 ENCOUNTER — APPOINTMENT (OUTPATIENT)
Dept: CARDIOLOGY | Facility: CLINIC | Age: 73
End: 2023-01-11

## 2023-03-14 ENCOUNTER — APPOINTMENT (OUTPATIENT)
Dept: CARDIOLOGY | Facility: CLINIC | Age: 73
End: 2023-03-14

## 2023-03-15 ENCOUNTER — APPOINTMENT (OUTPATIENT)
Dept: CARDIOLOGY | Facility: CLINIC | Age: 73
End: 2023-03-15
Payer: MEDICARE

## 2023-03-15 VITALS
BODY MASS INDEX: 21.35 KG/M2 | TEMPERATURE: 96.9 F | HEART RATE: 74 BPM | WEIGHT: 116 LBS | OXYGEN SATURATION: 98 % | DIASTOLIC BLOOD PRESSURE: 64 MMHG | HEIGHT: 62 IN | SYSTOLIC BLOOD PRESSURE: 112 MMHG

## 2023-03-15 DIAGNOSIS — R00.0 TACHYCARDIA, UNSPECIFIED: ICD-10-CM

## 2023-03-15 PROBLEM — R09.89 BRUIT OF RIGHT CAROTID ARTERY: Status: ACTIVE | Noted: 2023-03-15

## 2023-03-15 PROCEDURE — 99214 OFFICE O/P EST MOD 30 MIN: CPT

## 2023-03-15 RX ORDER — ASCORBIC ACID/BIOFLAVONOIDS 500-200 MG
TABLET ORAL DAILY
Refills: 0 | Status: ACTIVE | COMMUNITY

## 2023-03-15 RX ORDER — IBUPROFEN 200 MG/1
200 TABLET, COATED ORAL EVERY MORNING
Refills: 0 | Status: DISCONTINUED | COMMUNITY
End: 2023-03-15

## 2023-03-15 RX ORDER — B-COMPLEX WITH VITAMIN C
TABLET ORAL DAILY
Refills: 0 | Status: ACTIVE | COMMUNITY

## 2023-03-15 RX ORDER — BEXAROTENE 75 MG/1
75 CAPSULE, LIQUID FILLED ORAL DAILY
Refills: 0 | Status: ACTIVE | COMMUNITY

## 2023-03-15 RX ORDER — FENOFIBRATE 160 MG/1
160 TABLET ORAL
Qty: 90 | Refills: 0 | Status: ACTIVE | COMMUNITY

## 2023-03-15 RX ORDER — ERGOCALCIFEROL 1.25 MG/1
1.25 MG CAPSULE, LIQUID FILLED ORAL EVERY OTHER DAY
Refills: 0 | Status: ACTIVE | COMMUNITY

## 2023-03-15 RX ORDER — UBIDECARENONE 100 MG
100 CAPSULE ORAL
Refills: 0 | Status: ACTIVE | COMMUNITY

## 2023-03-15 RX ORDER — CETIRIZINE HYDROCHLORIDE 10 MG/1
10 CAPSULE, LIQUID FILLED ORAL
Refills: 0 | Status: DISCONTINUED | COMMUNITY
End: 2023-03-15

## 2023-03-15 NOTE — PHYSICAL EXAM
[Well Nourished] : well nourished [No Acute Distress] : no acute distress [Frail] : frail [No Rub] : no rub [No Gallop] : no gallop [Clear Lung Fields] : clear lung fields [Good Air Entry] : good air entry [No Respiratory Distress] : no respiratory distress  [Normal Gait] : normal gait [No Cyanosis] : no cyanosis [No Edema] : no edema [No Clubbing] : no clubbing [Normal Radial B/L] : normal radial B/L [Venous stasis] : venous stasis [Venous varicosities] : venous varicosities [Moves all extremities] : moves all extremities [Normal Speech] : normal speech [Alert and Oriented] : alert and oriented [de-identified] : Right carotid bruit or radiated murmur [de-identified] : Regular S1-S2 systolic murmur 2/6 left parasternal region at the base. [de-identified] : Skin changes related to see Sezary cell lymphoma. [de-identified] : Warm extremity

## 2023-03-15 NOTE — ASSESSMENT
[FreeTextEntry1] : past tests for reference:\par Echocardiogram.  8/8/2016.  60%.  Trace to mild mitral regurgitation.  Normal chamber dimensions.\par Stress echocardiogram 8/16/2016.\par LV ejection fraction 60% at baseline.  6 minutes 24 seconds of Derrick protocol.  Greater than 100% predicted maximum heart rate.  Normal blood pressure response.  Nonischemic symptoms.  Nonischemic EKG changes.  Nonischemic echocardiogram portion of the test.\par EKG ordered and interpreted by me on November 21, 2017, PA indication. Palpitations.\par Interpretation. Normal sinus rhythm. Borderline abnormal EKG,\par Recent labs were reviewed on the phone done by oncologist, which showed triglycerides 217, AST of 48, , total cholesterol 276. CMP was stable.\par \par February 21, 2019\par Event recorder. Normal sinus rhythm at baseline. Sinus tachycardia. Short less than 10 seconds of sequential PACs versus atrial tachycardia. No pauses noted.\par \par Reviewed February 27, 2018.\par Echocardiogram. November 28, 2017. Ejection fraction 60% normal chamber dimensions trace to mitral regurgitation, trace to mild aortic regurgitation PSP 37 mm mercury, suggestive borderline pulmonary pressures.\par Event recorder did not reveal any significant arrhythmias. She did not have significant palpitations.\par Labs from December 8, 2017 was reviewed\par \par Reviewed on December 18, 2018\par EKG December 18, 2018. Indication palpitation. History of essential hypertension with interpretation. Normal sinus rhythm.\par Echocardiogram was reviewed from December 18, 2018.\par Preserved ejection fraction. Mild mitral, aortic, and tricuspid regurgitation. Pulmonary artery systolic pressure around 33 mm mercury.\par I do not have recent labs though she does have recent labs, which he will be forwarding to me\par \par Reviewed on January 10, 2020. , triglycerides 133, HDL 26, total cholesterol 187. LFT, and BMP appears to be stable\par \par Reviewed on November 28, 2020\par Echocardiogram.  November 3, 2020 EF 67% mild mitral and minimal aortic regurgitation PASP 44 mmHg global longitudinal strain -25 which is normal\par \par Reviewed on August 25, 2021.\par Labs from June 23, 2021.  Stable CBC.  CMP with albumin 4.6 sodium 140 potassium 3.9 creatinine 0.040.\par LFT normal.  TSH normal\par EKG normal sinus rhythm with right bundle branch block\par \par Reviewed on June 14, 2022.\par Labs from May 17, 2022 as shown stable CBC sodium 140 potassium 3.9 creatinine 0.40.\par EKG as noted above\par Echocardiogram reviewed as noted above\par \par Reviewed on March 15, 2023.  Labs from January 31, 2023 TSH less than 0.1 Free T3 thyroxine 1.79  HDL 81 total cholesterol 220 triglycerides 85 potassium 4.9 sodium 145 creatinine 0.42 LFT overall stable.

## 2023-03-15 NOTE — REVIEW OF SYSTEMS
[Feeling Fatigued] : feeling fatigued [Palpitations] : palpitations [Anxiety] : anxiety [Under Stress] : under stress [Negative] : Neurological [SOB] : no shortness of breath [Dyspnea on exertion] : not dyspnea during exertion [Chest Discomfort] : no chest discomfort [Lower Ext Edema] : no extremity edema [Leg Claudication] : no intermittent leg claudication [Orthopnea] : no orthopnea [PND] : no PND [Syncope] : no syncope [Joint Pain] : no joint pain [FreeTextEntry5] : As noted above [FreeTextEntry9] : As per HPI

## 2023-03-15 NOTE — CARDIOLOGY SUMMARY
[___] : [unfilled] [LVEF ___%] : LVEF [unfilled]% [None] : no pulmonary hypertension [Enlarged] : enlarged LA size [Mild] : mild mitral regurgitation [de-identified] : August 25, 2022.  Normal sinus rhythm with right bundle branch block\par June 14, 2022.  Sinus tachycardia right bundle branch block [de-identified] : August 27, 2021 EF 55% mild mitral regurgitation normal left atrium RVSP 25.\par Echocardiogram.  Outside office.  April 12, 2022.  LVEF 60%.  Mild mitral and aortic regurgitation mild tricuspid regurgitation Global longitudinal strain -21.5%.

## 2023-03-15 NOTE — DISCUSSION/SUMMARY
[Patient] : the patient [Risks] : risks [Benefits] : benefits [Alternatives] : alternatives [FreeTextEntry1] : Assessment and recommendations.\par #1  Tachycardia.  Palpitations.  At present regular rate and rhythm.  If recurrent event will evaluate with event monitor.  At present proximate cause would be mild hypothyroidism and use of stimulant medication.  Hydration and avoidance of those medications discussed.\par #2 history of essential hypertension.  remaining relatively well controlled without any medications.  Has noticed slightly higher systolic and diastolic numbers at around 130/80.  She will continue to follow.  No antidepressant therapy at present.  \par #3 hyperlipidemia. Mixed..no recent labs available.  On Lipitor .  Stable labs from 2023 reviewed\par #4 nonrheumatic mitral, aortic insufficiency. Aortic valve sclerosis. Preserved ejection fraction.  Mild borderline elevation pulmonary pressures.  Most recent echocardiogram 2022 stable with good global longitudinal strain.  We will repeat it again prior to her hip surgery\par #5 l  Lymphoma.  Management with oncologist.\par \par All the above were at length reviewed. Answered all the questions. Thank you very much for this kind referral. Please do not hesitate to give me a call for any question.\par Part of this transcription was done with voice recognition software and phonetically similar errors are common. I apologize for that. Please donot hesitate to call for any questions due to above.\par Follow-up prior to hip surgery\par \par Sincerely,\par Greg Bustos MD,FACC,[facility]SON\par \par

## 2023-03-15 NOTE — REASON FOR VISIT
[Symptom and Test Evaluation] : symptom and test evaluation [Hyperlipidemia] : hyperlipidemia [Hypertension] : hypertension [Spouse] : spouse [FreeTextEntry3] : Dr. 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  She has not taken medication again.  No associated chest pain.  After that episode no further symptoms heart rate remaining stable\par She has no significant dizziness lightheadedness.  No near syncopal or syncopal event.\par Continued fatigue and tiredness\par She is planning to have total hip replacement in June.\par She denies any PND orthopnea\par She has no pedal edema\par Anxiety associated with her medical problems\par No recent hospital admission from cardiac point of view\par

## 2023-04-06 ENCOUNTER — APPOINTMENT (OUTPATIENT)
Dept: CARDIOLOGY | Facility: CLINIC | Age: 73
End: 2023-04-06
Payer: MEDICARE

## 2023-04-06 ENCOUNTER — NON-APPOINTMENT (OUTPATIENT)
Age: 73
End: 2023-04-06

## 2023-04-06 VITALS
HEART RATE: 70 BPM | DIASTOLIC BLOOD PRESSURE: 60 MMHG | BODY MASS INDEX: 21.35 KG/M2 | OXYGEN SATURATION: 98 % | SYSTOLIC BLOOD PRESSURE: 116 MMHG | WEIGHT: 116 LBS | HEIGHT: 62 IN

## 2023-04-06 PROCEDURE — 93000 ELECTROCARDIOGRAM COMPLETE: CPT

## 2023-04-06 PROCEDURE — 99215 OFFICE O/P EST HI 40 MIN: CPT

## 2023-04-06 NOTE — REASON FOR VISIT
[Symptom and Test Evaluation] : symptom and test evaluation [Hyperlipidemia] : hyperlipidemia [Hypertension] : hypertension [Spouse] : spouse [FreeTextEntry3] : Dr. 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  She has not taken medication again.  No associated chest pain.  After that episode no further symptoms heart rate remaining stable\par She has no significant dizziness lightheadedness.  No near syncopal or syncopal event.\par Continued fatigue and tiredness\par She is planning to have total hip replacement in June.\par She denies any PND orthopnea\par She has no pedal edema\par Anxiety associated with her medical problems\par No recent hospital admission from cardiac point of view\par \par Patient is presenting here today to be evaluated.  She had an episode of left-sided sharp chest pain yesterday.  It was on and off for a couple of hours.  It was a sharp sensation without radiation.  She took a couple of Tums with relief of the pain.  She has started some protein supplements and she feels that she is sensitive to that.  No shortness of breath.\par

## 2023-04-06 NOTE — CARDIOLOGY SUMMARY
[___] : [unfilled] [LVEF ___%] : LVEF [unfilled]% [None] : no pulmonary hypertension [Enlarged] : enlarged LA size [Mild] : mild mitral regurgitation [de-identified] : August 25, 2022.  Normal sinus rhythm with right bundle branch block\par June 14, 2022.  Sinus tachycardia right bundle branch block [de-identified] : August 27, 2021 EF 55% mild mitral regurgitation normal left atrium RVSP 25.\par Echocardiogram.  Outside office.  April 12, 2022.  LVEF 60%.  Mild mitral and aortic regurgitation mild tricuspid regurgitation Global longitudinal strain -21.5%.

## 2023-04-06 NOTE — DISCUSSION/SUMMARY
[Patient] : the patient [Risks] : risks [Benefits] : benefits [Alternatives] : alternatives [FreeTextEntry1] : Assessment and recommendations.\par #1  Tachycardia.  Palpitations.  At present regular rate and rhythm.  If recurrent event will evaluate with event monitor.  At present proximate cause would be mild hypothyroidism and use of stimulant medication.  Hydration and avoidance of those medications discussed.\par #2 history of essential hypertension.  remaining relatively well controlled without any medications.  Has noticed slightly higher systolic and diastolic numbers at around 130/80.  She will continue to follow.  No antidepressant therapy at present.  \par #3 hyperlipidemia. Mixed..no recent labs available.  On Lipitor .  Stable labs from 2023 reviewed\par #4 nonrheumatic mitral, aortic insufficiency. Aortic valve sclerosis. Preserved ejection fraction.  Mild borderline elevation pulmonary pressures.  Most recent echocardiogram 2022 stable with good global longitudinal strain.  We will repeat it again prior to her hip surgery\par #5 l  Lymphoma.  Management with oncologist.\par \par Episode of atypical chest pain yesterday.  If she develops any further chest pains that does not really within less than 10 minutes I asked the patient to go to emergency room.  We did an EKG today, normal sinus rhythm with right bundle branch block, unchanged from previous.  No further chest pains.  We discussed further testing, stress test versus CT calcium score.  Patient prefers CT scan calcium score to start with.  Follow-up after testing. [EKG obtained to assist in diagnosis and management of assessed problem(s)] : EKG obtained to assist in diagnosis and management of assessed problem(s)

## 2023-04-06 NOTE — PHYSICAL EXAM
[Well Nourished] : well nourished [No Acute Distress] : no acute distress [Frail] : frail [No Rub] : no rub [No Gallop] : no gallop [Clear Lung Fields] : clear lung fields [Good Air Entry] : good air entry [No Respiratory Distress] : no respiratory distress  [Normal Gait] : normal gait [No Edema] : no edema [No Cyanosis] : no cyanosis [No Clubbing] : no clubbing [Normal Radial B/L] : normal radial B/L [Venous stasis] : venous stasis [Venous varicosities] : venous varicosities [Moves all extremities] : moves all extremities [Normal Speech] : normal speech [Alert and Oriented] : alert and oriented [de-identified] : Right carotid bruit or radiated murmur [de-identified] : Regular S1-S2 systolic murmur 2/6 left parasternal region at the base. [de-identified] : Warm extremity [de-identified] : Skin changes related to see Sezary cell lymphoma.

## 2023-04-06 NOTE — REVIEW OF SYSTEMS
[Feeling Fatigued] : feeling fatigued [Palpitations] : palpitations [Anxiety] : anxiety [Under Stress] : under stress [Negative] : Heme/Lymph [SOB] : no shortness of breath [Dyspnea on exertion] : not dyspnea during exertion [Chest Discomfort] : no chest discomfort [Lower Ext Edema] : no extremity edema [Leg Claudication] : no intermittent leg claudication [Orthopnea] : no orthopnea [PND] : no PND [Syncope] : no syncope [Joint Pain] : no joint pain [FreeTextEntry5] : As noted above [FreeTextEntry9] : As per HPI

## 2023-05-04 ENCOUNTER — NON-APPOINTMENT (OUTPATIENT)
Age: 73
End: 2023-05-04

## 2023-05-04 ENCOUNTER — APPOINTMENT (OUTPATIENT)
Dept: CARDIOLOGY | Facility: CLINIC | Age: 73
End: 2023-05-04
Payer: MEDICARE

## 2023-05-04 VITALS
BODY MASS INDEX: 20.99 KG/M2 | DIASTOLIC BLOOD PRESSURE: 62 MMHG | WEIGHT: 117 LBS | HEIGHT: 62.5 IN | OXYGEN SATURATION: 100 % | HEART RATE: 78 BPM | SYSTOLIC BLOOD PRESSURE: 128 MMHG

## 2023-05-04 PROCEDURE — 99214 OFFICE O/P EST MOD 30 MIN: CPT

## 2023-05-04 NOTE — REASON FOR VISIT
[Symptom and Test Evaluation] : symptom and test evaluation [Hyperlipidemia] : hyperlipidemia [Hypertension] : hypertension [Spouse] : spouse [FreeTextEntry3] : Dr. 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  She has not taken medication again.  No associated chest pain.  After that episode no further symptoms heart rate remaining stable\par She has no significant dizziness lightheadedness.  No near syncopal or syncopal event.\par Continued fatigue and tiredness\par She is planning to have total hip replacement in June.\par She denies any PND orthopnea\par She has no pedal edema\par Anxiety associated with her medical problems\par \par \par No recent hospital admission from cardiac point of view\par \par Patient is presenting here today to be evaluated.  She had an episode of left-sided sharp chest pain yesterday.  It was on and off for a couple of hours.  It was a sharp sensation without radiation.  She took a couple of Tums with relief of the pain.  She has started some protein supplements and she feels that she is sensitive to that.  No shortness of breath.\par

## 2023-05-04 NOTE — CARDIOLOGY SUMMARY
[___] : [unfilled] [LVEF ___%] : LVEF [unfilled]% [None] : no pulmonary hypertension [Enlarged] : enlarged LA size [Mild] : mild mitral regurgitation [de-identified] : August 25, 2022.  Normal sinus rhythm with right bundle branch block\par June 14, 2022.  Sinus tachycardia right bundle branch block [de-identified] : August 27, 2021 EF 55% mild mitral regurgitation normal left atrium RVSP 25.\par Echocardiogram.  Outside office.  April 12, 2022.  LVEF 60%.  Mild mitral and aortic regurgitation mild tricuspid regurgitation Global longitudinal strain -21.5%.

## 2023-05-04 NOTE — DISCUSSION/SUMMARY
[Patient] : the patient [Risks] : risks [Benefits] : benefits [Alternatives] : alternatives [FreeTextEntry1] : Assessment and recommendations.\par #1  Tachycardia.  Palpitations.  At present regular rate and rhythm.  If recurrent event will evaluate with event monitor.  At present proximate cause would be mild hypothyroidism and use of stimulant medication.  Hydration and avoidance of those medications discussed.\par #2 history of essential hypertension.  remaining relatively well controlled without any medications.  Has noticed slightly higher systolic and diastolic numbers at around 130/80.  She will continue to follow.  No antidepressant therapy at present.  \par #3 hyperlipidemia. Mixed..no recent labs available.  On Lipitor .  Stable labs from 2023 reviewed\par #4 nonrheumatic mitral, aortic insufficiency. Aortic valve sclerosis. Preserved ejection fraction.  Mild borderline elevation pulmonary pressures.  Most recent echocardiogram 2022 stable with good global longitudinal strain.  We will repeat it again prior to her hip surgery\par #5 l  Lymphoma.  Management with oncologist.\par \par Episode of atypical chest pain yesterday.  If she develops any further chest pains that does not really within less than 10 minutes I asked the patient to go to emergency room.  We did an EKG today, normal sinus rhythm with right bundle branch block, unchanged from previous.  No further chest pains. \par Hospital records reviewed.  Zio patch results not available yet.  We will update and review.  Increase hydration.  Follow-up with her cardiologist Dr. Bustos which is already scheduled in few weeks.

## 2023-05-04 NOTE — PHYSICAL EXAM
[Well Nourished] : well nourished [No Acute Distress] : no acute distress [Frail] : frail [No Rub] : no rub [No Gallop] : no gallop [Clear Lung Fields] : clear lung fields [Good Air Entry] : good air entry [No Respiratory Distress] : no respiratory distress  [Normal Gait] : normal gait [No Edema] : no edema [No Cyanosis] : no cyanosis [No Clubbing] : no clubbing [Normal Radial B/L] : normal radial B/L [Venous stasis] : venous stasis [Venous varicosities] : venous varicosities [Moves all extremities] : moves all extremities [Normal Speech] : normal speech [Alert and Oriented] : alert and oriented [de-identified] : Right carotid bruit or radiated murmur [de-identified] : Regular S1-S2 systolic murmur 2/6 left parasternal region at the base. [de-identified] : Warm extremity [de-identified] : Skin changes related to see Sezary cell lymphoma.

## 2023-05-08 ENCOUNTER — NON-APPOINTMENT (OUTPATIENT)
Age: 73
End: 2023-05-08

## 2023-05-23 ENCOUNTER — APPOINTMENT (OUTPATIENT)
Dept: MRI IMAGING | Facility: CLINIC | Age: 73
End: 2023-05-23
Payer: MEDICARE

## 2023-05-23 PROCEDURE — 72141 MRI NECK SPINE W/O DYE: CPT | Mod: MH

## 2023-05-23 PROCEDURE — 70551 MRI BRAIN STEM W/O DYE: CPT | Mod: MH

## 2023-06-06 ENCOUNTER — APPOINTMENT (OUTPATIENT)
Dept: CARDIOLOGY | Facility: CLINIC | Age: 73
End: 2023-06-06
Payer: MEDICARE

## 2023-06-06 VITALS
HEART RATE: 76 BPM | OXYGEN SATURATION: 99 % | HEIGHT: 62.5 IN | BODY MASS INDEX: 20.81 KG/M2 | DIASTOLIC BLOOD PRESSURE: 60 MMHG | SYSTOLIC BLOOD PRESSURE: 120 MMHG | WEIGHT: 116 LBS

## 2023-06-06 DIAGNOSIS — R93.1 ABNORMAL FINDINGS ON DIAGNOSTIC IMAGING OF HEART AND CORONARY CIRCULATION: ICD-10-CM

## 2023-06-06 DIAGNOSIS — I45.10 UNSPECIFIED RIGHT BUNDLE-BRANCH BLOCK: ICD-10-CM

## 2023-06-06 PROCEDURE — 99214 OFFICE O/P EST MOD 30 MIN: CPT

## 2023-06-06 RX ORDER — LEVOTHYROXINE SODIUM 175 UG/1
175 CAPSULE ORAL
Refills: 0 | Status: ACTIVE | COMMUNITY

## 2023-06-06 RX ORDER — PANTOPRAZOLE 40 MG/1
40 TABLET, DELAYED RELEASE ORAL DAILY
Refills: 0 | Status: ACTIVE | COMMUNITY

## 2023-06-06 NOTE — PHYSICAL EXAM
[Well Nourished] : well nourished [No Acute Distress] : no acute distress [Frail] : frail [No Rub] : no rub [No Gallop] : no gallop [Clear Lung Fields] : clear lung fields [Good Air Entry] : good air entry [No Respiratory Distress] : no respiratory distress  [No Edema] : no edema [No Cyanosis] : no cyanosis [No Clubbing] : no clubbing [Normal Radial B/L] : normal radial B/L [Venous stasis] : venous stasis [Venous varicosities] : venous varicosities [Moves all extremities] : moves all extremities [Normal Speech] : normal speech [Alert and Oriented] : alert and oriented [de-identified] : Right carotid bruit or radiated murmur [de-identified] : Regular S1-S2 systolic murmur 2/6 left parasternal region at the base. [de-identified] : Walks with a cane [de-identified] : Warm extremity [de-identified] : Skin changes related to see Sezary cell lymphoma.

## 2023-06-06 NOTE — REASON FOR VISIT
[Symptom and Test Evaluation] : symptom and test evaluation [Hyperlipidemia] : hyperlipidemia [Hypertension] : hypertension [Spouse] : spouse [FreeTextEntry3] : Dr. Galan [FreeTextEntry1] : 73-year-old is seen in the office for preoperative cardiac assessment prior to hip surgery.\par She was recently admitted to [facility] with possible significant gastroesophageal reflux disease related symptoms.  And palpitations.  She was ruled out for myocardial infarction.  No recurrence of symptoms since then..  Her echocardiogram showed preserved EF.  Normal wall motion.  Myocardial perfusion scan done at [facility].  Had a fixed perfusion defect.  Event monitor did not reveal any significant arrhythmias she has limited activity and walks with a cane.\par She has no significant dizziness lightheadedness.  No near syncopal or syncopal event.\par Continued fatigue and tiredness\par She denies any PND orthopnea\par She has no pedal edema\par Anxiety associated with her medical problems\par No recent hospital admission from cardiac point of view\par

## 2023-06-06 NOTE — DISCUSSION/SUMMARY
[Patient] : the patient [Risks] : risks [Benefits] : benefits [Alternatives] : alternatives [FreeTextEntry1] : Assessment and recommendations.\par \par At present, there are no active unstable cardiac conditions.  Moderate risk.\par No recent unstable coronary syndrome, decompensated heart failure, severe valvular heart disease or significant dysrhythmias.\par The clinical benefit of the proposed procedure outweighs the associated cardiovascular risk.\par Risk not attenuated with further cardiovascular testing.\par Prior testing as outlined above.\par Optimized from a cardiovascular perspective.\par Control blood pressure, heart rate, pulse oximetry perioperatively.\par If required appropriate intravenous medication for the outpatient oral medication for blood pressure, heart rate control.\par DVT prophylaxis as per indication.\par \par #1 abnormal myocardial perfusion scan.  Fixed defect.  Echo no wall motion abnormality.  Preserved EF.  No signs of unstable CAD.  Aspirin 81 mg.  Continue statin therapy.  LDL goal less than 70.  At present considering clinical status is stable for the ischemic evaluation would not change the management.  Reviewed options of coronary CTA or invasive coronary angiography guided therapy.  At present decided to continue conservative medical management unless change in clinical status.\par #2 history of essential hypertension.  remaining relatively well controlled without any medications.  Has noticed slightly higher systolic and diastolic numbers at around 130/80.  She will continue to follow.  No antidepressant therapy at present.  \par #3 hyperlipidemia. Mixed..no recent labs available.  On Lipitor .  Stable labs from 2023 reviewed\par #4 nonrheumatic mitral, aortic insufficiency. Aortic valve sclerosis. Preserved ejection fraction.  Mild borderline elevation pulmonary pressures.  Most recent echocardiogram 2022 stable with good global longitudinal strain.  We will repeat it again prior to her hip surgery\par #5 l  Lymphoma.  Management with oncologist.\par \par All the above were at length reviewed. Answered all the questions. Thank you very much for this kind referral. Please do not hesitate to give me a call for any question.\par Part of this transcription was done with voice recognition software and phonetically similar errors are common. I apologize for that. Please donot hesitate to call for any questions due to above.\par Follow-up prior to hip surgery\par \par Sincerely,\par Greg Bustos MD,FACC,[facility]\par \par

## 2023-06-06 NOTE — ASSESSMENT
[FreeTextEntry1] : \par \par Reviewed on March 15, 2023.  Labs from January 31, 2023 TSH less than 0.1 Free T3 thyroxine 1.79  HDL 81 total cholesterol 220 triglycerides 85 potassium 4.9 sodium 145 creatinine 0.42 LFT overall stable.

## 2023-06-06 NOTE — CARDIOLOGY SUMMARY
[___] : [unfilled] [LVEF ___%] : LVEF [unfilled]% [None] : no pulmonary hypertension [Enlarged] : enlarged LA size [Mild] : mild mitral regurgitation [de-identified] : August 25, 2022.  Normal sinus rhythm with right bundle branch block\par June 14, 2022.  Sinus tachycardia right bundle branch block\par April 2023.  Normal sinus rhythm right bundle branch block [de-identified] : April 16, 2023.  Preserved EF.  Fixed perfusion inferior inferoseptal defect.  With normal motion and thickening.  No ischemia LVEF 69% [de-identified] : August 27, 2021 EF 55% mild mitral regurgitation normal left atrium RVSP 25.\par Echocardiogram.  Outside office.  April 12, 2022.  LVEF 60%.  Mild mitral and aortic regurgitation mild tricuspid regurgitation Global longitudinal strain -21.5%.\par April 14, 2023 LVEF 60% mild to moderate aortic regurgitation mild tricuspid regurgitation

## 2023-06-06 NOTE — REVIEW OF SYSTEMS
[Feeling Fatigued] : feeling fatigued [Palpitations] : palpitations [Under Stress] : under stress [Anxiety] : anxiety [Negative] : Heme/Lymph [SOB] : no shortness of breath [Dyspnea on exertion] : not dyspnea during exertion [Chest Discomfort] : no chest discomfort [Lower Ext Edema] : no extremity edema [Leg Claudication] : no intermittent leg claudication [Orthopnea] : no orthopnea [PND] : no PND [Syncope] : no syncope [Joint Pain] : no joint pain [FreeTextEntry5] : As noted above [FreeTextEntry9] : As per HPI

## 2023-08-04 ENCOUNTER — APPOINTMENT (OUTPATIENT)
Dept: CARDIOLOGY | Facility: CLINIC | Age: 73
End: 2023-08-04
Payer: MEDICARE

## 2023-08-04 ENCOUNTER — NON-APPOINTMENT (OUTPATIENT)
Age: 73
End: 2023-08-04

## 2023-08-04 VITALS
SYSTOLIC BLOOD PRESSURE: 102 MMHG | OXYGEN SATURATION: 98 % | HEART RATE: 75 BPM | BODY MASS INDEX: 21.9 KG/M2 | DIASTOLIC BLOOD PRESSURE: 60 MMHG | WEIGHT: 119 LBS | HEIGHT: 62 IN

## 2023-08-04 DIAGNOSIS — Z91.89 OTHER SPECIFIED PERSONAL RISK FACTORS, NOT ELSEWHERE CLASSIFIED: ICD-10-CM

## 2023-08-04 DIAGNOSIS — R07.89 OTHER CHEST PAIN: ICD-10-CM

## 2023-08-04 DIAGNOSIS — I49.1 ATRIAL PREMATURE DEPOLARIZATION: ICD-10-CM

## 2023-08-04 PROCEDURE — 99214 OFFICE O/P EST MOD 30 MIN: CPT

## 2023-08-04 PROCEDURE — 93000 ELECTROCARDIOGRAM COMPLETE: CPT

## 2023-08-04 RX ORDER — ASPIRIN 81 MG/1
81 TABLET ORAL DAILY
Qty: 90 | Refills: 3 | Status: DISCONTINUED | COMMUNITY
Start: 2023-06-06 | End: 2023-08-04

## 2023-08-04 NOTE — REASON FOR VISIT
[Symptom and Test Evaluation] : symptom and test evaluation [Hyperlipidemia] : hyperlipidemia [Hypertension] : hypertension [Spouse] : spouse [FreeTextEntry3] : Dr. Galan [FreeTextEntry1] : 73-year-old female is seen in the office after recent hip surgery.  According to her she started walking a mile a day without any significant symptoms.  She did notice before the hip surgery neck pain and intermittent left upper chest pain.  No relation to exertion.  No other associated symptoms.  She had no significant complications during the hip surgery. She was recently admitted to [facility] with possible significant gastroesophageal reflux disease related symptoms.  And palpitations.  She was ruled out for myocardial infarction.  No recurrence of symptoms since then..  Her echocardiogram showed preserved EF.  Normal wall motion.  Myocardial perfusion scan done at [facility].  Had a fixed perfusion defect.  Event monitor did not reveal any significant arrhythmias she has limited activity and walks with a cane. She has no significant dizziness lightheadedness.  No near syncopal or syncopal event. Continued fatigue and tiredness She denies any PND orthopnea She has no pedal edema Anxiety associated with her medical problems No recent hospital admission from cardiac point of view

## 2023-08-04 NOTE — PHYSICAL EXAM
[Well Nourished] : well nourished [No Acute Distress] : no acute distress [Frail] : frail [No Rub] : no rub [No Gallop] : no gallop [Clear Lung Fields] : clear lung fields [Good Air Entry] : good air entry [No Respiratory Distress] : no respiratory distress  [No Edema] : no edema [No Cyanosis] : no cyanosis [No Clubbing] : no clubbing [Normal Radial B/L] : normal radial B/L [Venous stasis] : venous stasis [Venous varicosities] : venous varicosities [Moves all extremities] : moves all extremities [Normal Speech] : normal speech [Alert and Oriented] : alert and oriented [de-identified] : Right carotid bruit or radiated murmur [de-identified] : Regular S1-S2 systolic murmur 2/6 left parasternal region at the base. [de-identified] : Walks with a cane [de-identified] : Warm extremity [de-identified] : Skin changes related to see Sezary cell lymphoma.

## 2023-08-04 NOTE — CARDIOLOGY SUMMARY
[___] : [unfilled] [___] : [unfilled] [LVEF ___%] : LVEF [unfilled]% [None] : no pulmonary hypertension [Enlarged] : enlarged LA size [Mild] : mild mitral regurgitation [de-identified] : August 25, 2022.  Normal sinus rhythm with right bundle branch block June 14, 2022.  Sinus tachycardia right bundle branch block April 2023.  Normal sinus rhythm right bundle branch block August 4, 2023.  Normal sinus rhythm right bundle branch block [de-identified] : April 16, 2023.  Preserved EF.  Fixed perfusion inferior inferoseptal defect.  With normal motion and thickening.  No ischemia LVEF 69% [de-identified] : August 27, 2021 EF 55% mild mitral regurgitation normal left atrium RVSP 25. Echocardiogram.  Outside office.  April 12, 2022.  LVEF 60%.  Mild mitral and aortic regurgitation mild tricuspid regurgitation Global longitudinal strain -21.5%. April 14, 2023 LVEF 60% mild to moderate aortic regurgitation mild tricuspid regurgitation

## 2023-08-04 NOTE — ASSESSMENT
[FreeTextEntry1] :  Reviewed on March 15, 2023.  Labs from January 31, 2023 TSH less than 0.1 Free T3 thyroxine 1.79  HDL 81 total cholesterol 220 triglycerides 85 potassium 4.9 sodium 145 creatinine 0.42 LFT overall stable.    Reviewed on August 4, 2023.  EKG as noted above.

## 2023-08-04 NOTE — DISCUSSION/SUMMARY
[Patient] : the patient [Risks] : risks [Benefits] : benefits [Alternatives] : alternatives [FreeTextEntry1] : Assessment and recommendations.  #1 abnormal myocardial perfusion scan.  Fixed defect.  Echo no wall motion abnormality.  Preserved EF.  No signs of unstable CAD.  Aspirin 81 mg.  Continue statin therapy.  LDL goal less than 70.  At present considering clinical status is stable for the ischemic evaluation would not change the management.  Reviewed options of coronary CTA or invasive coronary angiography guided therapy.  At present decided to continue conservative medical management unless change in clinical status. #2 history of essential hypertension.  remaining relatively well controlled without any medications.  Has noticed slightly higher systolic and diastolic numbers at around 130/80.  She will continue to follow.  No antidepressant therapy at present.   #3 hyperlipidemia. Mixed..no recent labs available.  On Lipitor .  Stable labs from 2023 reviewed #4 nonrheumatic mitral, aortic insufficiency. Aortic valve sclerosis. Preserved ejection fraction.  Mild borderline elevation pulmonary pressures.   #5 l  Lymphoma.  Management with oncologist. 6.  Arthritis.  Cervical arthritis.  Probably reason for Herneck and left upper chest pain.  Low probability of high risk coronary insufficiency All the above were at length reviewed. Answered all the questions. Thank you very much for this kind referral. Please do not hesitate to give me a call for any question. Part of this transcription was done with voice recognition software and phonetically similar errors are common. I apologize for that. Please donot hesitate to call for any questions due to above. Follow-up prior to hip surgery  Sincerely, Greg Bustos MD,FAC,DAWSON

## 2024-01-24 ENCOUNTER — NON-APPOINTMENT (OUTPATIENT)
Age: 74
End: 2024-01-24

## 2024-01-24 ENCOUNTER — APPOINTMENT (OUTPATIENT)
Dept: CARDIOLOGY | Facility: CLINIC | Age: 74
End: 2024-01-24
Payer: MEDICARE

## 2024-01-24 VITALS
OXYGEN SATURATION: 99 % | HEART RATE: 72 BPM | WEIGHT: 120 LBS | BODY MASS INDEX: 22.08 KG/M2 | HEIGHT: 62 IN | DIASTOLIC BLOOD PRESSURE: 70 MMHG | SYSTOLIC BLOOD PRESSURE: 120 MMHG

## 2024-01-24 DIAGNOSIS — R01.1 CARDIAC MURMUR, UNSPECIFIED: ICD-10-CM

## 2024-01-24 DIAGNOSIS — R00.2 PALPITATIONS: ICD-10-CM

## 2024-01-24 DIAGNOSIS — R94.31 ABNORMAL ELECTROCARDIOGRAM [ECG] [EKG]: ICD-10-CM

## 2024-01-24 DIAGNOSIS — I10 ESSENTIAL (PRIMARY) HYPERTENSION: ICD-10-CM

## 2024-01-24 DIAGNOSIS — Z01.810 ENCOUNTER FOR PREPROCEDURAL CARDIOVASCULAR EXAMINATION: ICD-10-CM

## 2024-01-24 DIAGNOSIS — I49.8 OTHER SPECIFIED CARDIAC ARRHYTHMIAS: ICD-10-CM

## 2024-01-24 DIAGNOSIS — E78.2 MIXED HYPERLIPIDEMIA: ICD-10-CM

## 2024-01-24 PROCEDURE — 99214 OFFICE O/P EST MOD 30 MIN: CPT

## 2024-01-24 PROCEDURE — G2211 COMPLEX E/M VISIT ADD ON: CPT

## 2024-01-24 PROCEDURE — 93000 ELECTROCARDIOGRAM COMPLETE: CPT | Mod: NC

## 2024-01-24 RX ORDER — PROPRANOLOL HYDROCHLORIDE 10 MG/1
10 TABLET ORAL
Qty: 30 | Refills: 0 | Status: ACTIVE | COMMUNITY
Start: 2024-01-24 | End: 1900-01-01

## 2024-01-24 RX ORDER — BUPROPION HCL 150 MG
150 TABLET,SUSTAINED-RELEASE 12 HR ORAL DAILY
Refills: 0 | Status: ACTIVE | COMMUNITY

## 2024-01-24 NOTE — REASON FOR VISIT
[Symptom and Test Evaluation] : symptom and test evaluation [Hyperlipidemia] : hyperlipidemia [Hypertension] : hypertension [Spouse] : spouse [FreeTextEntry3] : Dr. Galan [FreeTextEntry1] : 73-year-old female is seen in the office prior to endoscopy planned in the CT.  She had an episode of palpitation lasting for few minutes.  Scared her.  Though not associate with any chest pain shortness of breath dizziness near syncopal or syncopal event.  Resolved on its own.  It is similar to her palpitations associated with atrial tachycardia in the past. She has had recent hip surgery and has recovered reasonably well. She was also recently admitted to [facility] with possible significant gastroesophageal reflux disease related symptoms.  And palpitations.  She was ruled out for myocardial infarction.  No recurrence of symptoms since then..  Her echocardiogram showed preserved EF.  Normal wall motion.  Myocardial perfusion scan done at [facility].  Had a fixed perfusion defect.  Event monitor did not reveal any significant arrhythmias she has limited activity and walks with a cane. She has no significant dizziness lightheadedness.  No near syncopal or syncopal event. Continued fatigue and tiredness She denies any PND orthopnea She has no pedal edema Anxiety associated with her medical problems No recent hospital admission from cardiac point of view

## 2024-01-24 NOTE — CARDIOLOGY SUMMARY
[___] : [unfilled] [LVEF ___%] : LVEF [unfilled]% [None] : no pulmonary hypertension [Enlarged] : enlarged LA size [Mild] : mild mitral regurgitation [de-identified] : August 25, 2022.  Normal sinus rhythm with right bundle branch block June 14, 2022.  Sinus tachycardia right bundle branch block April 2023.  Normal sinus rhythm right bundle branch block LAD August 4, 2023.  Normal sinus rhythm right bundle branch block January 24, 2024.  Normal sinus rhythm.  Right bundle branch block. [de-identified] : April 16, 2023.  Preserved EF.  Fixed perfusion inferior inferoseptal defect.  With normal motion and thickening.  No ischemia LVEF 69% [de-identified] : August 27, 2021 EF 55% mild mitral regurgitation normal left atrium RVSP 25. Echocardiogram.  Outside office.  April 12, 2022.  LVEF 60%.  Mild mitral and aortic regurgitation mild tricuspid regurgitation Global longitudinal strain -21.5%. April 14, 2023 LVEF 60% mild to moderate aortic regurgitation mild tricuspid regurgitation

## 2024-01-24 NOTE — REVIEW OF SYSTEMS
[Feeling Fatigued] : feeling fatigued [Palpitations] : palpitations [Anxiety] : anxiety [Under Stress] : under stress [Negative] : Heme/Lymph [SOB] : no shortness of breath [Dyspnea on exertion] : not dyspnea during exertion [Chest Discomfort] : no chest discomfort [Lower Ext Edema] : no extremity edema [Leg Claudication] : no intermittent leg claudication [Orthopnea] : no orthopnea [PND] : no PND [Syncope] : no syncope [Joint Pain] : no joint pain [FreeTextEntry9] : As per HPI [FreeTextEntry5] : As noted above

## 2024-01-24 NOTE — DISCUSSION/SUMMARY
[Patient] : the patient [Risks] : risks [Benefits] : benefits [Alternatives] : alternatives [FreeTextEntry1] : Assessment and recommendations. 73-year-old female with above medical history active medical problems as noted below 1.  At present, there are no active cardiac conditions.  For endoscopy. No recent unstable coronary syndrome, decompensated heart failure, severe valvular heart disease or significant dysrhythmias. The clinical benefit of the proposed procedure outweighs the associated cardiovascular risk. Risk not attenuated with further cardiovascular testing. Prior testing as outlined above. Optimized from a cardiovascular perspective. Control blood pressure, heart rate, pulse oximetry perioperatively. If required appropriate intravenous medication for the outpatient oral medication for blood pressure, heart rate control. DVT prophylaxis as per indication. #2 palpitations.  History of atrial tachyarrhythmias.  Brief episodes.  Intermittent.  Recommended propranolol as needed.  If more frequent episodes will discuss further regarding standing dose of beta-blockers. Normal sinus rhythm right bundle branch block and left axis deviation/LAHB.  Continue to follow.  No syncope dizziness. #3 abnormal myocardial perfusion scan.  Fixed defect.  Echo no wall motion abnormality.  Preserved EF.  No signs of unstable CAD.  Aspirin 81 mg.  Continue statin therapy.  LDL goal less than 70.  At present considering clinical status is stable for the ischemic evaluation would not change the management.  Reviewed options of coronary CTA or invasive coronary angiography guided therapy.  At present decided to continue conservative medical management unless change in clinical status. # 4 history of essential hypertension.  remaining relatively well controlled without any medications.  Has noticed slightly higher systolic and diastolic numbers at around 130/80.  She will continue to follow.  No antidepressant therapy at present.   #5 hyperlipidemia. Mixed..no recent labs available.  On Lipitor .  Stable labs from 2023 reviewed # 6nonrheumatic mitral, aortic insufficiency. Aortic valve sclerosis. Preserved ejection fraction.  Mild borderline elevation pulmonary pressures.    All the above were at length reviewed. Answered all the questions. Thank you very much for this kind referral. Please do not hesitate to give me a call for any question. Part of this transcription was done with voice recognition software and phonetically similar errors are common. I apologize for that. Please donot hesitate to call for any questions due to above. Follow-up prior to hip surgery  Sincerely, Greg Bustos MD,FACC,DAWSON

## 2024-01-24 NOTE — PHYSICAL EXAM
[Well Nourished] : well nourished [No Acute Distress] : no acute distress [Frail] : frail [No Rub] : no rub [No Gallop] : no gallop [Clear Lung Fields] : clear lung fields [Good Air Entry] : good air entry [No Respiratory Distress] : no respiratory distress  [No Edema] : no edema [No Cyanosis] : no cyanosis [No Clubbing] : no clubbing [Normal Radial B/L] : normal radial B/L [Venous stasis] : venous stasis [Venous varicosities] : venous varicosities [Moves all extremities] : moves all extremities [Normal Speech] : normal speech [Alert and Oriented] : alert and oriented [de-identified] : Right carotid bruit or radiated murmur [de-identified] : Regular S1-S2 systolic murmur 2/6 left parasternal region at the base. [de-identified] : Walks with a cane [de-identified] : Warm extremity [de-identified] : Skin changes related to see Sezary cell lymphoma.

## 2024-01-24 NOTE — ASSESSMENT
[FreeTextEntry1] : January 24, 2024.  EKG from today reviewed. Labs from December 2023 were also reviewed.

## 2024-03-04 ENCOUNTER — APPOINTMENT (OUTPATIENT)
Dept: CARDIOLOGY | Facility: CLINIC | Age: 74
End: 2024-03-04

## 2024-04-30 DIAGNOSIS — Q27.8 OTHER SPECIFIED CONGENITAL MALFORMATIONS OF PERIPHERAL VASCULAR SYSTEM: ICD-10-CM

## 2024-04-30 DIAGNOSIS — I70.0 ATHEROSCLEROSIS OF AORTA: ICD-10-CM

## 2024-07-16 ENCOUNTER — APPOINTMENT (OUTPATIENT)
Dept: MRI IMAGING | Facility: CLINIC | Age: 74
End: 2024-07-16
Payer: MEDICARE

## 2024-07-16 PROCEDURE — 72148 MRI LUMBAR SPINE W/O DYE: CPT | Mod: MH

## 2024-07-16 PROCEDURE — 72195 MRI PELVIS W/O DYE: CPT | Mod: MH

## 2024-07-29 ENCOUNTER — NON-APPOINTMENT (OUTPATIENT)
Age: 74
End: 2024-07-29

## 2024-07-30 ENCOUNTER — NON-APPOINTMENT (OUTPATIENT)
Age: 74
End: 2024-07-30

## 2024-07-30 ENCOUNTER — APPOINTMENT (OUTPATIENT)
Dept: CARDIOLOGY | Facility: CLINIC | Age: 74
End: 2024-07-30
Payer: MEDICARE

## 2024-07-30 VITALS
HEIGHT: 62 IN | HEART RATE: 79 BPM | SYSTOLIC BLOOD PRESSURE: 110 MMHG | OXYGEN SATURATION: 98 % | DIASTOLIC BLOOD PRESSURE: 68 MMHG | BODY MASS INDEX: 22.82 KG/M2 | WEIGHT: 124 LBS

## 2024-07-30 DIAGNOSIS — Z91.89 OTHER SPECIFIED PERSONAL RISK FACTORS, NOT ELSEWHERE CLASSIFIED: ICD-10-CM

## 2024-07-30 DIAGNOSIS — I49.8 OTHER SPECIFIED CARDIAC ARRHYTHMIAS: ICD-10-CM

## 2024-07-30 DIAGNOSIS — I10 ESSENTIAL (PRIMARY) HYPERTENSION: ICD-10-CM

## 2024-07-30 DIAGNOSIS — E78.2 MIXED HYPERLIPIDEMIA: ICD-10-CM

## 2024-07-30 PROCEDURE — 93000 ELECTROCARDIOGRAM COMPLETE: CPT

## 2024-07-30 PROCEDURE — G2211 COMPLEX E/M VISIT ADD ON: CPT

## 2024-07-30 PROCEDURE — 99214 OFFICE O/P EST MOD 30 MIN: CPT

## 2024-07-30 NOTE — REASON FOR VISIT
[Symptom and Test Evaluation] : symptom and test evaluation [Hyperlipidemia] : hyperlipidemia [Hypertension] : hypertension [Spouse] : spouse [FreeTextEntry3] : Dr. Galan [FreeTextEntry1] : 74-year-old female is seen in the office With complaint of palpitation associated with use of Ritalin.  She stopped taking it and her symptoms have improved. Continued fatigue and tiredness She denies any PND orthopnea She has no pedal edema Anxiety associated with her medical problems No recent hospital admission from cardiac point of view

## 2024-07-30 NOTE — CARDIOLOGY SUMMARY
[___] : [unfilled] [LVEF ___%] : LVEF [unfilled]% [None] : no pulmonary hypertension [Enlarged] : enlarged LA size [Mild] : mild mitral regurgitation [de-identified] : August 25, 2022.  Normal sinus rhythm with right bundle branch block June 14, 2022.  Sinus tachycardia right bundle branch block April 2023.  Normal sinus rhythm right bundle branch block LAD August 4, 2023.  Normal sinus rhythm right bundle branch block January 24, 2024.  Normal sinus rhythm.  Right bundle branch block. July 30, 2024.  Normal sinus rhythm low voltage right bundle branch block unchanged from before. [de-identified] : April 16, 2023.  Preserved EF.  Fixed perfusion inferior inferoseptal defect.  With normal motion and thickening.  No ischemia LVEF 69% [de-identified] : August 27, 2021 EF 55% mild mitral regurgitation normal left atrium RVSP 25. Echocardiogram.  Outside office.  April 12, 2022.  LVEF 60%.  Mild mitral and aortic regurgitation mild tricuspid regurgitation Global longitudinal strain -21.5%. April 14, 2023 LVEF 60% mild to moderate aortic regurgitation mild tricuspid regurgitation

## 2024-07-30 NOTE — DISCUSSION/SUMMARY
[Patient] : the patient [Risks] : risks [Benefits] : benefits [Alternatives] : alternatives [FreeTextEntry1] : Assessment and recommendations. 74-year-old female with above medical history active medical problems as noted below 1.  Labs reviewed.  EKG reviewed.  Follow-up labs ordered in 6 months. #2 palpitations.  History of atrial tachyarrhythmias.  Brief episodes.  Intermittent.  Recommended propranolol as needed.  If more frequent episodes will discuss further regarding standing dose of beta-blockers. Normal sinus rhythm right bundle branch block and left axis deviation/LAHB.  Continue to follow.  No syncope dizziness. Try to avoid ritalin  like medication. #3 abnormal myocardial perfusion scan.  Fixed defect.  Echo no wall motion abnormality.  Preserved EF.  No signs of unstable CAD.  Aspirin 81 mg.  Continue statin therapy.  LDL goal less than 70.  At present considering clinical status is stable for the ischemic evaluation would not change the management.  Reviewed options of coronary CTA or invasive coronary angiography guided therapy.  At present decided to continue conservative medical management unless change in clinical status. # 4 history of essential hypertension.  remaining relatively well controlled without any medications.  Has noticed slightly higher systolic and diastolic numbers at around 130/80.  She will continue to follow.  No antidepressant therapy at present.   #5 hyperlipidemia. Mixed.. On Lipito/fenofibrate r .  Repeat labs in 6 months ordered. # nonrheumatic mitral, aortic insufficiency. Aortic valve sclerosis. Preserved ejection fraction.  Mild borderline elevation pulmonary pressures.  Follow-up echocardiogram.  All the above were at length reviewed. Answered all the questions. Thank you very much for this kind referral. Please do not hesitate to give me a call for any question. Part of this transcription was done with voice recognition software and phonetically similar errors are common. I apologize for that. Please donot hesitate to call for any questions due to above. Follow-up prior to hip surgery  Sincerely, Greg Bustos MD,FACC,DAWSON   [EKG obtained to assist in diagnosis and management of assessed problem(s)] : EKG obtained to assist in diagnosis and management of assessed problem(s)

## 2024-07-30 NOTE — ASSESSMENT
[FreeTextEntry1] : January 24, 2024.  EKG from today reviewed. Labs from December 2023 were also reviewed.  Reviewed on July 30, 2024. EKG from today reviewed Labs from February 2024 were reviewed.

## 2024-07-30 NOTE — REVIEW OF SYSTEMS
[Feeling Fatigued] : feeling fatigued [Palpitations] : palpitations [Anxiety] : anxiety [Under Stress] : under stress [Negative] : Heme/Lymph [SOB] : no shortness of breath [Chest Discomfort] : no chest discomfort [Lower Ext Edema] : no extremity edema [Leg Claudication] : no intermittent leg claudication [Orthopnea] : no orthopnea [PND] : no PND [Syncope] : no syncope [Joint Pain] : no joint pain [FreeTextEntry5] : As per HPI [FreeTextEntry9] : As per HPI

## 2024-07-30 NOTE — PHYSICAL EXAM
[Frail] : frail [Normal Venous Pressure] : normal venous pressure [No Carotid Bruit] : no carotid bruit [Normal S1, S2] : normal S1, S2 [Murmur] : murmur [Clear Lung Fields] : clear lung fields [No Edema] : no edema [Normal Radial B/L] : normal radial B/L [de-identified] : Blood pressure 110/68, heart rate 79, weight 224 pounds, saturation 98% room air.  July 30, 2024.

## 2024-08-14 ENCOUNTER — APPOINTMENT (OUTPATIENT)
Dept: CARDIOLOGY | Facility: CLINIC | Age: 74
End: 2024-08-14
Payer: MEDICARE

## 2024-08-14 ENCOUNTER — NON-APPOINTMENT (OUTPATIENT)
Age: 74
End: 2024-08-14

## 2024-08-14 VITALS
OXYGEN SATURATION: 97 % | BODY MASS INDEX: 22.63 KG/M2 | HEIGHT: 62 IN | WEIGHT: 123 LBS | SYSTOLIC BLOOD PRESSURE: 112 MMHG | HEART RATE: 75 BPM | DIASTOLIC BLOOD PRESSURE: 64 MMHG

## 2024-08-14 DIAGNOSIS — I10 ESSENTIAL (PRIMARY) HYPERTENSION: ICD-10-CM

## 2024-08-14 DIAGNOSIS — I48.0 PAROXYSMAL ATRIAL FIBRILLATION: ICD-10-CM

## 2024-08-14 DIAGNOSIS — E78.2 MIXED HYPERLIPIDEMIA: ICD-10-CM

## 2024-08-14 PROCEDURE — 99214 OFFICE O/P EST MOD 30 MIN: CPT

## 2024-08-14 PROCEDURE — 93000 ELECTROCARDIOGRAM COMPLETE: CPT

## 2024-08-14 RX ORDER — APIXABAN 5 MG/1
5 TABLET, FILM COATED ORAL
Qty: 180 | Refills: 3 | Status: ACTIVE | COMMUNITY
Start: 2024-08-14 | End: 1900-01-01

## 2024-08-14 RX ORDER — PREDNISONE 50 MG/1
50 TABLET ORAL
Qty: 3 | Refills: 0 | Status: ACTIVE | COMMUNITY
Start: 1900-01-01 | End: 1900-01-01

## 2024-08-14 NOTE — REASON FOR VISIT
[Symptom and Test Evaluation] : symptom and test evaluation [Hyperlipidemia] : hyperlipidemia [Hypertension] : hypertension [Spouse] : spouse [FreeTextEntry3] : Dr. Galan

## 2024-08-14 NOTE — DISCUSSION/SUMMARY
[Patient] : the patient [Risks] : risks [Benefits] : benefits [Alternatives] : alternatives [FreeTextEntry1] : LEA TARIQ  is a 74 year F  who presents today Aug 14, 2024 with the above history and the following active issues.    Abnormal myocardial perfusion scan, episodes of chest pain, PAF, risk factor for CAD. Recommend further ischemic evaluation with CTA coronary arteries.  History of essential hypertension.  remaining relatively well controlled without any medications.  Has noticed slightly higher systolic and diastolic numbers at around 130/80.  She will continue to follow.  No antidepressant therapy at present.    Hyperlipidemia. Mixed.. On Lipito/fenofibrate r .  Repeat labs in 6 months ordered.  Non-rheumatic mitral, aortic insufficiency. Aortic valve sclerosis. Preserved ejection fraction.  Mild borderline elevation pulmonary pressures.  Follow-up echocardiogram.  New onset PAF Recommend the following: - Eliquis 5mg BID - follow up labs including CBC, CMP, TSH and BNP - bleeding precautions reviewed - echo for reassessment of resting heart structure and function - ischemic eval with CTA - reviewed role of ILR and she does not feel her skin would tolerate the dvc - close clinical follow-up  Red flag symptoms which would warrant sooner emergent evaluation reviewed with the patient.  Questions and concerns were addressed and answered.  Limitations of non-invasive testing reviewed  Sincerely,  Angely Gastelum PA-C Patients history, testing and plan reviewed with supervising MD: Dr. Greg Bustos

## 2024-08-14 NOTE — HISTORY OF PRESENT ILLNESS
[FreeTextEntry1] : LEA TARIQ  is a 74 year F  who presents today Aug 14, 2024 requesting to be seen. Last night she had episodes of palpitations and her Apple watch detected new onset PAF. EKG in the office today demonstrates restoration of NSR. There have been intermittent episodes of palpitations over the past year. Difficulty with wearing ASHLEY due to skin irritation and reactions.  There have also been episodes of chest discomfort which is to left side of chest, lasting min in duration and shortness of breath.   Her medical history. Recently been treated for Sezary cell lymphoma. History of hypertension. On low dose of beta blockers in the past. Not on any medication at present. No history of congestive heart failure or renal insufficiency. History of mixed hyperlipidemia with elevated triglycerides. On fenofibrate and statin therapy. Tolerating it well. Hypothyroidism. . On Synthroid replacement be a stable TSH within last 3 months. We have been followed with endocrinologist. History of psoriasis. History of chronic anemia. History of hyperlipidemia, mixed on fenofibrate/atorvastatin Non-rheumatic mitral/aortic regurgitation.

## 2024-08-14 NOTE — REVIEW OF SYSTEMS
[Feeling Fatigued] : feeling fatigued [SOB] : no shortness of breath [Chest Discomfort] : no chest discomfort [Lower Ext Edema] : no extremity edema [Leg Claudication] : no intermittent leg claudication [Palpitations] : palpitations [Orthopnea] : no orthopnea [PND] : no PND [Syncope] : no syncope [Joint Pain] : no joint pain [Anxiety] : anxiety [Under Stress] : under stress [Negative] : Heme/Lymph [FreeTextEntry5] : As per HPI [FreeTextEntry9] : As per HPI

## 2024-08-14 NOTE — CARDIOLOGY SUMMARY
[de-identified] : August 25, 2022.  Normal sinus rhythm with right bundle branch block June 14, 2022.  Sinus tachycardia right bundle branch block April 2023.  Normal sinus rhythm right bundle branch block LAD August 4, 2023.  Normal sinus rhythm right bundle branch block January 24, 2024.  Normal sinus rhythm.  Right bundle branch block. July 30, 2024.  Normal sinus rhythm low voltage right bundle branch block unchanged from before. [de-identified] : April 16, 2023.  Preserved EF.  Fixed perfusion inferior inferoseptal defect.  With normal motion and thickening.  No ischemia LVEF 69% [de-identified] : August 27, 2021 EF 55% mild mitral regurgitation normal left atrium RVSP 25. Echocardiogram.  Outside office.  April 12, 2022.  LVEF 60%.  Mild mitral and aortic regurgitation mild tricuspid regurgitation Global longitudinal strain -21.5%. April 14, 2023 LVEF 60% mild to moderate aortic regurgitation mild tricuspid regurgitation [___] : [unfilled] [LVEF ___%] : LVEF [unfilled]% [None] : no pulmonary hypertension [Enlarged] : enlarged LA size [Mild] : mild mitral regurgitation

## 2024-08-14 NOTE — PHYSICAL EXAM
[Frail] : frail [Normal Venous Pressure] : normal venous pressure [No Carotid Bruit] : no carotid bruit [Normal S1, S2] : normal S1, S2 [Murmur] : murmur [Clear Lung Fields] : clear lung fields [No Edema] : no edema [Normal Radial B/L] : normal radial B/L [de-identified] : Blood pressure 110/68, heart rate 79, weight 224 pounds, saturation 98% room air.  July 30, 2024.

## 2024-08-14 NOTE — PHYSICAL EXAM
[Frail] : frail [Normal Venous Pressure] : normal venous pressure [No Carotid Bruit] : no carotid bruit [Normal S1, S2] : normal S1, S2 [Murmur] : murmur [Clear Lung Fields] : clear lung fields [No Edema] : no edema [Normal Radial B/L] : normal radial B/L [de-identified] : Blood pressure 110/68, heart rate 79, weight 224 pounds, saturation 98% room air.  July 30, 2024.

## 2024-08-14 NOTE — CARDIOLOGY SUMMARY
[de-identified] : August 25, 2022.  Normal sinus rhythm with right bundle branch block June 14, 2022.  Sinus tachycardia right bundle branch block April 2023.  Normal sinus rhythm right bundle branch block LAD August 4, 2023.  Normal sinus rhythm right bundle branch block January 24, 2024.  Normal sinus rhythm.  Right bundle branch block. July 30, 2024.  Normal sinus rhythm low voltage right bundle branch block unchanged from before. [de-identified] : April 16, 2023.  Preserved EF.  Fixed perfusion inferior inferoseptal defect.  With normal motion and thickening.  No ischemia LVEF 69% [de-identified] : August 27, 2021 EF 55% mild mitral regurgitation normal left atrium RVSP 25. Echocardiogram.  Outside office.  April 12, 2022.  LVEF 60%.  Mild mitral and aortic regurgitation mild tricuspid regurgitation Global longitudinal strain -21.5%. April 14, 2023 LVEF 60% mild to moderate aortic regurgitation mild tricuspid regurgitation [___] : [unfilled] [LVEF ___%] : LVEF [unfilled]% [None] : no pulmonary hypertension [Enlarged] : enlarged LA size [Mild] : mild mitral regurgitation

## 2024-08-19 ENCOUNTER — APPOINTMENT (OUTPATIENT)
Dept: CARDIOLOGY | Facility: CLINIC | Age: 74
End: 2024-08-19
Payer: MEDICARE

## 2024-08-19 PROCEDURE — 93306 TTE W/DOPPLER COMPLETE: CPT

## 2024-08-27 ENCOUNTER — NON-APPOINTMENT (OUTPATIENT)
Age: 74
End: 2024-08-27

## 2024-09-19 ENCOUNTER — APPOINTMENT (OUTPATIENT)
Dept: CARDIOLOGY | Facility: CLINIC | Age: 74
End: 2024-09-19
Payer: MEDICARE

## 2024-09-19 VITALS
HEIGHT: 62 IN | HEART RATE: 93 BPM | OXYGEN SATURATION: 98 % | BODY MASS INDEX: 22.63 KG/M2 | DIASTOLIC BLOOD PRESSURE: 64 MMHG | SYSTOLIC BLOOD PRESSURE: 120 MMHG | WEIGHT: 123 LBS

## 2024-09-19 DIAGNOSIS — I48.0 PAROXYSMAL ATRIAL FIBRILLATION: ICD-10-CM

## 2024-09-19 DIAGNOSIS — R00.2 PALPITATIONS: ICD-10-CM

## 2024-09-19 DIAGNOSIS — I10 ESSENTIAL (PRIMARY) HYPERTENSION: ICD-10-CM

## 2024-09-19 DIAGNOSIS — E03.9 HYPOTHYROIDISM, UNSPECIFIED: ICD-10-CM

## 2024-09-19 PROCEDURE — 99214 OFFICE O/P EST MOD 30 MIN: CPT

## 2024-09-19 PROCEDURE — G2211 COMPLEX E/M VISIT ADD ON: CPT

## 2024-09-19 NOTE — CARDIOLOGY SUMMARY
[___] : [unfilled] [LVEF ___%] : LVEF [unfilled]% [None] : no pulmonary hypertension [Enlarged] : enlarged LA size [Mild] : mild mitral regurgitation [de-identified] : August 25, 2022.  Normal sinus rhythm with right bundle branch block June 14, 2022.  Sinus tachycardia right bundle branch block April 2023.  Normal sinus rhythm right bundle branch block LAD August 4, 2023.  Normal sinus rhythm right bundle branch block January 24, 2024.  Normal sinus rhythm.  Right bundle branch block. July 30, 2024.  Normal sinus rhythm low voltage right bundle branch block unchanged from before. [de-identified] : April 16, 2023.  Preserved EF.  Fixed perfusion inferior inferoseptal defect.  With normal motion and thickening.  No ischemia LVEF 69% [de-identified] : August 27, 2021 EF 55% mild mitral regurgitation normal left atrium RVSP 25. Echocardiogram.  Outside office.  April 12, 2022.  LVEF 60%.  Mild mitral and aortic regurgitation mild tricuspid regurgitation Global longitudinal strain -21.5%. April 14, 2023 LVEF 60% mild to moderate aortic regurgitation mild tricuspid regurgitation Echocardiogram August 19, 2024.  EF 65%.  Mild to moderate AR mild TR.  PASP 33.  Mildly dilated left atrium.

## 2024-09-19 NOTE — DISCUSSION/SUMMARY
[Patient] : the patient [Risks] : risks [Benefits] : benefits [Alternatives] : alternatives [FreeTextEntry1] : LEA TARIQ  is a 74 year F  who presents today  with the above history and the following active issues.   Significant fatigue tiredness bloating sensation.  Most likely related to decreased dose of Synthroid.  Recommended to discuss with PMD endocrinologist to decide about appropriate dosing of Synthroid.  Will follow for atrial fibrillation on the appropriate tolerated dose of Synthroid to keep her in euthyroid state.  Abnormal myocardial perfusion scan, episodes of chest pain, PAF, risk factor for CAD. Recommend further ischemic evaluation with CTA coronary arteries.  History of essential hypertension.  remaining relatively well controlled without any medications.  Has noticed slightly higher systolic and diastolic numbers at around 130/80.  She will continue to follow.  No antidepressant therapy at present.    Hyperlipidemia. Mixed.. On Lipito/fenofibrate r .  Repeat labs in 6 months ordered.  Non-rheumatic mitral, aortic insufficiency. Aortic valve sclerosis. Preserved ejection fraction.  Mild borderline elevation pulmonary pressures.  Follow-up echocardiogram.  New onset PAF Preserved EF.  Mildly dilated left atrium. Recommend the following: - Eliquis 5mg BID - bleeding precautions reviewed - ischemic eval with CTA - reviewed role of ILR and she does not feel her skin would tolerate the dvc - close clinical follow-up  Red flag symptoms which would warrant sooner emergent evaluation reviewed with the patient.  Questions and concerns were addressed and answered.  Counseling regarding low saturated fat,salt and carbohydrate intake was reviewed. Active lifestyle and regular exercise  along with weight management is advised. I have reviewed above at length. I answered all the questions. Patient verbalized understandings. Thank you very much for allowing me to participate in your patient's care. Please feel free to call me for any questions. Sincerely,  Greg Bustos MD, FACC, DAWSON

## 2024-09-19 NOTE — CARDIOLOGY SUMMARY
[___] : [unfilled] [LVEF ___%] : LVEF [unfilled]% [None] : no pulmonary hypertension [Enlarged] : enlarged LA size [Mild] : mild mitral regurgitation [de-identified] : August 25, 2022.  Normal sinus rhythm with right bundle branch block June 14, 2022.  Sinus tachycardia right bundle branch block April 2023.  Normal sinus rhythm right bundle branch block LAD August 4, 2023.  Normal sinus rhythm right bundle branch block January 24, 2024.  Normal sinus rhythm.  Right bundle branch block. July 30, 2024.  Normal sinus rhythm low voltage right bundle branch block unchanged from before. [de-identified] : April 16, 2023.  Preserved EF.  Fixed perfusion inferior inferoseptal defect.  With normal motion and thickening.  No ischemia LVEF 69% [de-identified] : August 27, 2021 EF 55% mild mitral regurgitation normal left atrium RVSP 25. Echocardiogram.  Outside office.  April 12, 2022.  LVEF 60%.  Mild mitral and aortic regurgitation mild tricuspid regurgitation Global longitudinal strain -21.5%. April 14, 2023 LVEF 60% mild to moderate aortic regurgitation mild tricuspid regurgitation Echocardiogram August 19, 2024.  EF 65%.  Mild to moderate AR mild TR.  PASP 33.  Mildly dilated left atrium.

## 2024-09-19 NOTE — ASSESSMENT
[FreeTextEntry1] :   Reviewed on September 19, 2024.  Rhythm strips available from her wearable device from September 17 September 3 showed normal sinus rhythm with PVC. Recent labs from September 12, 2024 LDL was 144 .  Hemoglobin was 12.5.  Creatinine was 0.5 hemoglobin A1c was 5.0 potassium was 4.5 sodium was 141. Echocardiogram reviewed.

## 2024-09-19 NOTE — HISTORY OF PRESENT ILLNESS
[FreeTextEntry1] : LEA TARIQ  is a 74 year F  who presents today requesting to be seen.  Recently her thyroid supplement with Synthroid was decreased from 175 to 125 mcg a day.  With that according to her she has significant fatigue tiredness not feeling well bloating sensation.  She has not had any further episodes of atrial fibrillation which she monitors it with variable device.   Her shortness of breath has remained stable.  Her medical history. Recently been treated for Sezary cell lymphoma. History of hypertension. On low dose of beta blockers in the past. Not on any medication at present. No history of congestive heart failure or renal insufficiency. History of mixed hyperlipidemia with elevated triglycerides. On fenofibrate and statin therapy. Tolerating it well. Hypothyroidism. . On Synthroid replacement be a stable TSH within last 3 months. We have been followed with endocrinologist. History of psoriasis. History of chronic anemia. History of hyperlipidemia, mixed on fenofibrate/atorvastatin Non-rheumatic mitral/aortic regurgitation.

## 2024-10-29 ENCOUNTER — RESULT REVIEW (OUTPATIENT)
Age: 74
End: 2024-10-29

## 2024-11-13 ENCOUNTER — APPOINTMENT (OUTPATIENT)
Dept: CARDIOLOGY | Facility: CLINIC | Age: 74
End: 2024-11-13
Payer: MEDICARE

## 2024-11-13 VITALS
DIASTOLIC BLOOD PRESSURE: 62 MMHG | SYSTOLIC BLOOD PRESSURE: 120 MMHG | HEART RATE: 82 BPM | WEIGHT: 124 LBS | BODY MASS INDEX: 22.68 KG/M2 | OXYGEN SATURATION: 99 %

## 2024-11-13 DIAGNOSIS — I10 ESSENTIAL (PRIMARY) HYPERTENSION: ICD-10-CM

## 2024-11-13 DIAGNOSIS — I45.10 UNSPECIFIED RIGHT BUNDLE-BRANCH BLOCK: ICD-10-CM

## 2024-11-13 DIAGNOSIS — I48.0 PAROXYSMAL ATRIAL FIBRILLATION: ICD-10-CM

## 2024-11-13 DIAGNOSIS — I25.10 ATHEROSCLEROTIC HEART DISEASE OF NATIVE CORONARY ARTERY W/OUT ANGINA PECTORIS: ICD-10-CM

## 2024-11-13 LAB — HBA1C MFR BLD HPLC: 5

## 2024-11-13 PROCEDURE — 99214 OFFICE O/P EST MOD 30 MIN: CPT

## 2024-12-09 ENCOUNTER — NON-APPOINTMENT (OUTPATIENT)
Age: 74
End: 2024-12-09

## 2025-01-17 ENCOUNTER — NON-APPOINTMENT (OUTPATIENT)
Age: 75
End: 2025-01-17

## 2025-01-17 ENCOUNTER — APPOINTMENT (OUTPATIENT)
Dept: CARDIOLOGY | Facility: CLINIC | Age: 75
End: 2025-01-17
Payer: MEDICARE

## 2025-01-17 VITALS
DIASTOLIC BLOOD PRESSURE: 66 MMHG | HEIGHT: 62 IN | OXYGEN SATURATION: 100 % | BODY MASS INDEX: 22.63 KG/M2 | SYSTOLIC BLOOD PRESSURE: 128 MMHG | WEIGHT: 123 LBS | HEART RATE: 65 BPM

## 2025-01-17 DIAGNOSIS — I49.8 OTHER SPECIFIED CARDIAC ARRHYTHMIAS: ICD-10-CM

## 2025-01-17 DIAGNOSIS — I25.10 ATHEROSCLEROTIC HEART DISEASE OF NATIVE CORONARY ARTERY W/OUT ANGINA PECTORIS: ICD-10-CM

## 2025-01-17 DIAGNOSIS — I08.0 RHEUMATIC DISORDERS OF BOTH MITRAL AND AORTIC VALVES: ICD-10-CM

## 2025-01-17 DIAGNOSIS — R00.2 PALPITATIONS: ICD-10-CM

## 2025-01-17 DIAGNOSIS — I10 ESSENTIAL (PRIMARY) HYPERTENSION: ICD-10-CM

## 2025-01-17 DIAGNOSIS — R60.0 LOCALIZED EDEMA: ICD-10-CM

## 2025-01-17 DIAGNOSIS — R01.1 CARDIAC MURMUR, UNSPECIFIED: ICD-10-CM

## 2025-01-17 DIAGNOSIS — I49.1 ATRIAL PREMATURE DEPOLARIZATION: ICD-10-CM

## 2025-01-17 PROCEDURE — 93000 ELECTROCARDIOGRAM COMPLETE: CPT

## 2025-01-17 PROCEDURE — 99204 OFFICE O/P NEW MOD 45 MIN: CPT

## 2025-01-21 ENCOUNTER — TRANSCRIPTION ENCOUNTER (OUTPATIENT)
Age: 75
End: 2025-01-21

## 2025-01-21 RX ORDER — POTASSIUM CHLORIDE 750 MG/1
10 TABLET, FILM COATED, EXTENDED RELEASE ORAL
Qty: 90 | Refills: 0 | Status: ACTIVE | COMMUNITY
Start: 2025-01-21 | End: 1900-01-01

## 2025-02-12 ENCOUNTER — APPOINTMENT (OUTPATIENT)
Dept: CARDIOLOGY | Facility: CLINIC | Age: 75
End: 2025-02-12
Payer: MEDICARE

## 2025-02-12 VITALS
DIASTOLIC BLOOD PRESSURE: 56 MMHG | HEIGHT: 62 IN | HEART RATE: 76 BPM | BODY MASS INDEX: 22.63 KG/M2 | OXYGEN SATURATION: 99 % | WEIGHT: 123 LBS | SYSTOLIC BLOOD PRESSURE: 112 MMHG

## 2025-02-12 DIAGNOSIS — E78.2 MIXED HYPERLIPIDEMIA: ICD-10-CM

## 2025-02-12 DIAGNOSIS — I25.10 ATHEROSCLEROTIC HEART DISEASE OF NATIVE CORONARY ARTERY W/OUT ANGINA PECTORIS: ICD-10-CM

## 2025-02-12 DIAGNOSIS — R60.0 LOCALIZED EDEMA: ICD-10-CM

## 2025-02-12 DIAGNOSIS — I48.0 PAROXYSMAL ATRIAL FIBRILLATION: ICD-10-CM

## 2025-02-12 DIAGNOSIS — I10 ESSENTIAL (PRIMARY) HYPERTENSION: ICD-10-CM

## 2025-02-12 PROCEDURE — G2211 COMPLEX E/M VISIT ADD ON: CPT

## 2025-02-12 PROCEDURE — 99214 OFFICE O/P EST MOD 30 MIN: CPT

## 2025-02-12 RX ORDER — METHOTREXATE 2.5 MG/1
2.5 TABLET ORAL
Refills: 0 | Status: ACTIVE | COMMUNITY

## 2025-02-12 RX ORDER — FUROSEMIDE 20 MG/1
20 TABLET ORAL
Qty: 90 | Refills: 1 | Status: ACTIVE | COMMUNITY
Start: 2025-02-12 | End: 1900-01-01

## 2025-03-19 ENCOUNTER — NON-APPOINTMENT (OUTPATIENT)
Age: 75
End: 2025-03-19

## 2025-04-21 ENCOUNTER — APPOINTMENT (OUTPATIENT)
Dept: CARDIOLOGY | Facility: CLINIC | Age: 75
End: 2025-04-21
Payer: MEDICARE

## 2025-04-21 VITALS
DIASTOLIC BLOOD PRESSURE: 62 MMHG | BODY MASS INDEX: 22.82 KG/M2 | HEIGHT: 62 IN | HEART RATE: 78 BPM | WEIGHT: 124 LBS | OXYGEN SATURATION: 96 % | SYSTOLIC BLOOD PRESSURE: 130 MMHG

## 2025-04-21 DIAGNOSIS — I25.10 ATHEROSCLEROTIC HEART DISEASE OF NATIVE CORONARY ARTERY W/OUT ANGINA PECTORIS: ICD-10-CM

## 2025-04-21 DIAGNOSIS — R60.0 LOCALIZED EDEMA: ICD-10-CM

## 2025-04-21 DIAGNOSIS — I08.0 RHEUMATIC DISORDERS OF BOTH MITRAL AND AORTIC VALVES: ICD-10-CM

## 2025-04-21 DIAGNOSIS — I48.0 PAROXYSMAL ATRIAL FIBRILLATION: ICD-10-CM

## 2025-04-21 DIAGNOSIS — I10 ESSENTIAL (PRIMARY) HYPERTENSION: ICD-10-CM

## 2025-04-21 PROCEDURE — 99214 OFFICE O/P EST MOD 30 MIN: CPT

## 2025-04-21 PROCEDURE — G2211 COMPLEX E/M VISIT ADD ON: CPT

## 2025-05-19 ENCOUNTER — NON-APPOINTMENT (OUTPATIENT)
Age: 75
End: 2025-05-19

## 2025-05-19 ENCOUNTER — APPOINTMENT (OUTPATIENT)
Dept: VASCULAR SURGERY | Facility: CLINIC | Age: 75
End: 2025-05-19
Payer: MEDICARE

## 2025-05-19 VITALS
WEIGHT: 124 LBS | HEART RATE: 64 BPM | BODY MASS INDEX: 23.41 KG/M2 | DIASTOLIC BLOOD PRESSURE: 68 MMHG | HEIGHT: 61 IN | SYSTOLIC BLOOD PRESSURE: 132 MMHG

## 2025-05-19 DIAGNOSIS — Z87.891 PERSONAL HISTORY OF NICOTINE DEPENDENCE: ICD-10-CM

## 2025-05-19 PROCEDURE — 93970 EXTREMITY STUDY: CPT

## 2025-05-19 PROCEDURE — 99204 OFFICE O/P NEW MOD 45 MIN: CPT

## 2025-06-06 ENCOUNTER — APPOINTMENT (OUTPATIENT)
Dept: MRI IMAGING | Facility: CLINIC | Age: 75
End: 2025-06-06

## 2025-07-17 RX ORDER — POTASSIUM CHLORIDE 750 MG/1
10 TABLET, FILM COATED, EXTENDED RELEASE ORAL
Qty: 90 | Refills: 3 | Status: ACTIVE | COMMUNITY
Start: 2025-07-17 | End: 1900-01-01

## 2025-07-17 RX ORDER — FUROSEMIDE 20 MG/1
20 TABLET ORAL
Qty: 3 | Refills: 3 | Status: ACTIVE | COMMUNITY
Start: 2025-07-17

## 2025-09-02 ENCOUNTER — APPOINTMENT (OUTPATIENT)
Dept: CARDIOLOGY | Facility: CLINIC | Age: 75
End: 2025-09-02